# Patient Record
Sex: FEMALE | Race: WHITE | NOT HISPANIC OR LATINO
[De-identification: names, ages, dates, MRNs, and addresses within clinical notes are randomized per-mention and may not be internally consistent; named-entity substitution may affect disease eponyms.]

---

## 2017-01-04 ENCOUNTER — APPOINTMENT (OUTPATIENT)
Dept: INTERNAL MEDICINE | Facility: CLINIC | Age: 82
End: 2017-01-04

## 2017-01-04 ENCOUNTER — LABORATORY RESULT (OUTPATIENT)
Age: 82
End: 2017-01-04

## 2017-03-21 ENCOUNTER — RX RENEWAL (OUTPATIENT)
Age: 82
End: 2017-03-21

## 2017-03-23 ENCOUNTER — RX RENEWAL (OUTPATIENT)
Age: 82
End: 2017-03-23

## 2017-04-05 ENCOUNTER — MEDICATION RENEWAL (OUTPATIENT)
Age: 82
End: 2017-04-05

## 2017-06-26 ENCOUNTER — RX RENEWAL (OUTPATIENT)
Age: 82
End: 2017-06-26

## 2017-06-27 ENCOUNTER — MEDICATION RENEWAL (OUTPATIENT)
Age: 82
End: 2017-06-27

## 2017-07-21 ENCOUNTER — APPOINTMENT (OUTPATIENT)
Dept: INTERNAL MEDICINE | Facility: CLINIC | Age: 82
End: 2017-07-21

## 2017-07-21 ENCOUNTER — LABORATORY RESULT (OUTPATIENT)
Age: 82
End: 2017-07-21

## 2017-07-21 VITALS
SYSTOLIC BLOOD PRESSURE: 120 MMHG | TEMPERATURE: 97.3 F | WEIGHT: 138 LBS | DIASTOLIC BLOOD PRESSURE: 70 MMHG | BODY MASS INDEX: 25.24 KG/M2

## 2017-07-21 DIAGNOSIS — R06.00 DYSPNEA, UNSPECIFIED: ICD-10-CM

## 2017-07-21 DIAGNOSIS — I49.9 CARDIAC ARRHYTHMIA, UNSPECIFIED: ICD-10-CM

## 2017-07-22 ENCOUNTER — LABORATORY RESULT (OUTPATIENT)
Age: 82
End: 2017-07-22

## 2017-07-25 ENCOUNTER — MESSAGE (OUTPATIENT)
Age: 82
End: 2017-07-25

## 2017-07-27 ENCOUNTER — TRANSCRIPTION ENCOUNTER (OUTPATIENT)
Age: 82
End: 2017-07-27

## 2017-07-27 ENCOUNTER — MESSAGE (OUTPATIENT)
Age: 82
End: 2017-07-27

## 2017-07-29 ENCOUNTER — MESSAGE (OUTPATIENT)
Age: 82
End: 2017-07-29

## 2017-08-06 LAB
ALBUMIN SERPL ELPH-MCNC: 4.2 G/DL
ALP BLD-CCNC: 69 U/L
ALT SERPL-CCNC: 15 U/L
ANION GAP SERPL CALC-SCNC: 16 MMOL/L
APPEARANCE: ABNORMAL
AST SERPL-CCNC: 14 U/L
BACTERIA: ABNORMAL
BASOPHILS # BLD AUTO: 0.04 K/UL
BASOPHILS NFR BLD AUTO: 0.4 %
BILIRUB SERPL-MCNC: 0.8 MG/DL
BILIRUBIN URINE: NEGATIVE
BLOOD URINE: NEGATIVE
BUN SERPL-MCNC: 24 MG/DL
CALCIUM SERPL-MCNC: 9.2 MG/DL
CHLORIDE SERPL-SCNC: 106 MMOL/L
CHOLEST SERPL-MCNC: 145 MG/DL
CHOLEST/HDLC SERPL: 3.3 RATIO
CO2 SERPL-SCNC: 23 MMOL/L
COLOR: YELLOW
CREAT SERPL-MCNC: 0.77 MG/DL
EOSINOPHIL # BLD AUTO: 0.28 K/UL
EOSINOPHIL NFR BLD AUTO: 3.1 %
GLUCOSE QUALITATIVE U: NORMAL MG/DL
GLUCOSE SERPL-MCNC: 125 MG/DL
HCT VFR BLD CALC: 37 %
HDLC SERPL-MCNC: 44 MG/DL
HGB BLD-MCNC: 11.9 G/DL
HYALINE CASTS: 0 /LPF
IMM GRANULOCYTES NFR BLD AUTO: 0.3 %
KETONES URINE: NEGATIVE
LDLC SERPL CALC-MCNC: 56 MG/DL
LDLC SERPL DIRECT ASSAY-MCNC: 76 MG/DL
LEUKOCYTE ESTERASE URINE: ABNORMAL
LYMPHOCYTES # BLD AUTO: 2.85 K/UL
LYMPHOCYTES NFR BLD AUTO: 31.4 %
MAN DIFF?: NORMAL
MCHC RBC-ENTMCNC: 31.8 PG
MCHC RBC-ENTMCNC: 32.2 GM/DL
MCV RBC AUTO: 98.9 FL
MICROSCOPIC-UA: NORMAL
MONOCYTES # BLD AUTO: 0.87 K/UL
MONOCYTES NFR BLD AUTO: 9.6 %
NEUTROPHILS # BLD AUTO: 5 K/UL
NEUTROPHILS NFR BLD AUTO: 55.2 %
NITRITE URINE: POSITIVE
NT-PROBNP SERPL-MCNC: 171 PG/ML
PH URINE: 5.5
PLATELET # BLD AUTO: 239 K/UL
POTASSIUM SERPL-SCNC: 4.3 MMOL/L
PROT SERPL-MCNC: 7.5 G/DL
PROTEIN URINE: NEGATIVE MG/DL
RBC # BLD: 3.74 M/UL
RBC # FLD: 13.4 %
RED BLOOD CELLS URINE: 3 /HPF
SODIUM SERPL-SCNC: 145 MMOL/L
SPECIFIC GRAVITY URINE: 1.02
SQUAMOUS EPITHELIAL CELLS: 4 /HPF
T3 SERPL-MCNC: 77 NG/DL
T3RU NFR SERPL: 0.96 INDEX
T4 FREE SERPL-MCNC: 1.6 NG/DL
T4 SERPL-MCNC: 9 UG/DL
TRIGL SERPL-MCNC: 224 MG/DL
TSH SERPL-ACNC: 1.29 UIU/ML
URINE COMMENTS: NORMAL
UROBILINOGEN URINE: NORMAL MG/DL
WBC # FLD AUTO: 9.07 K/UL
WHITE BLOOD CELLS URINE: 77 /HPF

## 2017-09-09 ENCOUNTER — RX RENEWAL (OUTPATIENT)
Age: 82
End: 2017-09-09

## 2017-10-18 ENCOUNTER — TRANSCRIPTION ENCOUNTER (OUTPATIENT)
Age: 82
End: 2017-10-18

## 2017-11-16 ENCOUNTER — MEDICATION RENEWAL (OUTPATIENT)
Age: 82
End: 2017-11-16

## 2017-11-19 ENCOUNTER — MEDICATION RENEWAL (OUTPATIENT)
Age: 82
End: 2017-11-19

## 2017-11-23 ENCOUNTER — RX RENEWAL (OUTPATIENT)
Age: 82
End: 2017-11-23

## 2017-12-19 ENCOUNTER — MEDICATION RENEWAL (OUTPATIENT)
Age: 82
End: 2017-12-19

## 2017-12-26 ENCOUNTER — MEDICATION RENEWAL (OUTPATIENT)
Age: 82
End: 2017-12-26

## 2018-03-07 ENCOUNTER — RX RENEWAL (OUTPATIENT)
Age: 83
End: 2018-03-07

## 2018-03-08 ENCOUNTER — RX RENEWAL (OUTPATIENT)
Age: 83
End: 2018-03-08

## 2018-03-08 ENCOUNTER — RECORD ABSTRACTING (OUTPATIENT)
Age: 83
End: 2018-03-08

## 2018-04-03 ENCOUNTER — APPOINTMENT (OUTPATIENT)
Dept: CARDIOLOGY | Facility: CLINIC | Age: 83
End: 2018-04-03
Payer: MEDICARE

## 2018-04-03 PROCEDURE — 93306 TTE W/DOPPLER COMPLETE: CPT

## 2018-04-12 ENCOUNTER — RESULT REVIEW (OUTPATIENT)
Age: 83
End: 2018-04-12

## 2018-05-12 ENCOUNTER — LABORATORY RESULT (OUTPATIENT)
Age: 83
End: 2018-05-12

## 2018-05-12 ENCOUNTER — APPOINTMENT (OUTPATIENT)
Dept: INTERNAL MEDICINE | Facility: CLINIC | Age: 83
End: 2018-05-12
Payer: MEDICARE

## 2018-05-12 VITALS
WEIGHT: 135 LBS | TEMPERATURE: 97.6 F | SYSTOLIC BLOOD PRESSURE: 130 MMHG | DIASTOLIC BLOOD PRESSURE: 74 MMHG | HEART RATE: 66 BPM | BODY MASS INDEX: 24.84 KG/M2 | HEIGHT: 62 IN | OXYGEN SATURATION: 92 % | RESPIRATION RATE: 14 BRPM

## 2018-05-12 DIAGNOSIS — M15.9 POLYOSTEOARTHRITIS, UNSPECIFIED: ICD-10-CM

## 2018-05-12 DIAGNOSIS — R15.9 FULL INCONTINENCE OF FECES: ICD-10-CM

## 2018-05-12 DIAGNOSIS — M81.0 AGE-RELATED OSTEOPOROSIS W/OUT CURRENT PATHOLOGICAL FRACTURE: ICD-10-CM

## 2018-05-12 DIAGNOSIS — J45.40 MODERATE PERSISTENT ASTHMA, UNCOMPLICATED: ICD-10-CM

## 2018-05-12 DIAGNOSIS — J44.9 CHRONIC OBSTRUCTIVE PULMONARY DISEASE, UNSPECIFIED: ICD-10-CM

## 2018-05-12 DIAGNOSIS — R15.2 FULL INCONTINENCE OF FECES: ICD-10-CM

## 2018-05-12 PROCEDURE — 93000 ELECTROCARDIOGRAM COMPLETE: CPT

## 2018-05-12 PROCEDURE — 94010 BREATHING CAPACITY TEST: CPT

## 2018-05-12 PROCEDURE — 99215 OFFICE O/P EST HI 40 MIN: CPT | Mod: 25

## 2018-05-12 PROCEDURE — 36415 COLL VENOUS BLD VENIPUNCTURE: CPT

## 2018-05-14 ENCOUNTER — LABORATORY RESULT (OUTPATIENT)
Age: 83
End: 2018-05-14

## 2018-05-14 ENCOUNTER — APPOINTMENT (OUTPATIENT)
Dept: INTERNAL MEDICINE | Facility: CLINIC | Age: 83
End: 2018-05-14
Payer: MEDICARE

## 2018-05-14 VITALS
SYSTOLIC BLOOD PRESSURE: 142 MMHG | HEIGHT: 62 IN | TEMPERATURE: 98.5 F | WEIGHT: 135 LBS | DIASTOLIC BLOOD PRESSURE: 70 MMHG | BODY MASS INDEX: 24.84 KG/M2

## 2018-05-14 DIAGNOSIS — Z87.440 PERSONAL HISTORY OF URINARY (TRACT) INFECTIONS: ICD-10-CM

## 2018-05-14 PROCEDURE — 99214 OFFICE O/P EST MOD 30 MIN: CPT

## 2018-05-15 LAB
25(OH)D3 SERPL-MCNC: 16.2 NG/ML
ALBUMIN SERPL ELPH-MCNC: 3.9 G/DL
ALP BLD-CCNC: 74 U/L
ALT SERPL-CCNC: 11 U/L
ANION GAP SERPL CALC-SCNC: 14 MMOL/L
APPEARANCE: ABNORMAL
AST SERPL-CCNC: 17 U/L
BASOPHILS # BLD AUTO: 0.04 K/UL
BASOPHILS NFR BLD AUTO: 0.5 %
BILIRUB SERPL-MCNC: 0.7 MG/DL
BILIRUBIN URINE: NEGATIVE
BLOOD URINE: ABNORMAL
BUN SERPL-MCNC: 21 MG/DL
CALCIUM SERPL-MCNC: 9.1 MG/DL
CHLORIDE SERPL-SCNC: 103 MMOL/L
CHOLEST SERPL-MCNC: 128 MG/DL
CHOLEST/HDLC SERPL: 2.7 RATIO
CO2 SERPL-SCNC: 27 MMOL/L
COLOR: YELLOW
CREAT SERPL-MCNC: 0.69 MG/DL
EOSINOPHIL # BLD AUTO: 0.31 K/UL
EOSINOPHIL NFR BLD AUTO: 3.8 %
GLUCOSE QUALITATIVE U: NEGATIVE MG/DL
GLUCOSE SERPL-MCNC: 102 MG/DL
HCT VFR BLD CALC: 38.9 %
HDLC SERPL-MCNC: 48 MG/DL
HGB BLD-MCNC: 11.6 G/DL
IMM GRANULOCYTES NFR BLD AUTO: 0.2 %
KETONES URINE: NEGATIVE
LDLC SERPL CALC-MCNC: 55 MG/DL
LEUKOCYTE ESTERASE URINE: ABNORMAL
LYMPHOCYTES # BLD AUTO: 2.66 K/UL
LYMPHOCYTES NFR BLD AUTO: 32.2 %
MAN DIFF?: NORMAL
MCHC RBC-ENTMCNC: 29.8 GM/DL
MCHC RBC-ENTMCNC: 31.1 PG
MCV RBC AUTO: 104.3 FL
MONOCYTES # BLD AUTO: 1.02 K/UL
MONOCYTES NFR BLD AUTO: 12.3 %
NEUTROPHILS # BLD AUTO: 4.21 K/UL
NEUTROPHILS NFR BLD AUTO: 51 %
NITRITE URINE: POSITIVE
PH URINE: 6
PLATELET # BLD AUTO: 296 K/UL
POTASSIUM SERPL-SCNC: 4.4 MMOL/L
PROT SERPL-MCNC: 7.6 G/DL
PROTEIN URINE: 30 MG/DL
RBC # BLD: 3.73 M/UL
RBC # FLD: 14.1 %
SAVE SPECIMEN: NORMAL
SODIUM SERPL-SCNC: 144 MMOL/L
SPECIFIC GRAVITY URINE: 1.02
T3 SERPL-MCNC: 92 NG/DL
T4 SERPL-MCNC: 10.4 UG/DL
TRIGL SERPL-MCNC: 126 MG/DL
TSH SERPL-ACNC: 0.73 UIU/ML
UROBILINOGEN URINE: NEGATIVE MG/DL
WBC # FLD AUTO: 8.26 K/UL

## 2018-05-17 ENCOUNTER — OTHER (OUTPATIENT)
Age: 83
End: 2018-05-17

## 2018-05-17 LAB
APPEARANCE: CLEAR
BILIRUBIN URINE: NEGATIVE
BLOOD URINE: NEGATIVE
COLOR: YELLOW
GLUCOSE QUALITATIVE U: NEGATIVE
KETONES URINE: NEGATIVE
LEUKOCYTE ESTERASE URINE: ABNORMAL
NITRITE URINE: POSITIVE
PH URINE: 6.5
PROTEIN URINE: ABNORMAL
SPECIFIC GRAVITY URINE: 1.01
UROBILINOGEN URINE: NEGATIVE

## 2018-05-30 ENCOUNTER — LABORATORY RESULT (OUTPATIENT)
Age: 83
End: 2018-05-30

## 2018-05-30 ENCOUNTER — APPOINTMENT (OUTPATIENT)
Dept: INTERNAL MEDICINE | Facility: CLINIC | Age: 83
End: 2018-05-30
Payer: MEDICARE

## 2018-05-30 PROCEDURE — 45385 COLONOSCOPY W/LESION REMOVAL: CPT

## 2018-05-30 PROCEDURE — 45380 COLONOSCOPY AND BIOPSY: CPT | Mod: 59

## 2018-07-16 ENCOUNTER — FORM ENCOUNTER (OUTPATIENT)
Age: 83
End: 2018-07-16

## 2018-07-16 ENCOUNTER — APPOINTMENT (OUTPATIENT)
Dept: INTERNAL MEDICINE | Facility: CLINIC | Age: 83
End: 2018-07-16
Payer: MEDICARE

## 2018-07-16 VITALS
DIASTOLIC BLOOD PRESSURE: 64 MMHG | OXYGEN SATURATION: 93 % | RESPIRATION RATE: 16 BRPM | TEMPERATURE: 98.6 F | SYSTOLIC BLOOD PRESSURE: 138 MMHG | HEIGHT: 62 IN | WEIGHT: 135 LBS | HEART RATE: 67 BPM | BODY MASS INDEX: 24.84 KG/M2

## 2018-07-16 DIAGNOSIS — G82.20 PARAPLEGIA, UNSPECIFIED: ICD-10-CM

## 2018-07-16 DIAGNOSIS — R07.1 CHEST PAIN ON BREATHING: ICD-10-CM

## 2018-07-16 DIAGNOSIS — J15.9 UNSPECIFIED BACTERIAL PNEUMONIA: ICD-10-CM

## 2018-07-16 PROCEDURE — 99215 OFFICE O/P EST HI 40 MIN: CPT

## 2018-07-16 NOTE — REVIEW OF SYSTEMS
[Chest Pain] : chest pain [Unsteady Walking] : ataxia [Negative] : Psychiatric [Palpitations] : no palpitations [Leg Claudication] : no leg claudication [Lower Ext Edema] : no lower extremity edema [Orthopnea] : no orthopnea [Paroysmal Nocturnal Dyspnea] : no paroysmal nocturnal dyspnea [Headache] : no headache [Dizziness] : no dizziness [Fainting] : no fainting [Confusion] : no confusion [Memory Loss] : no memory loss [FreeTextEntry5] : See present illness [FreeTextEntry6] : See present illness [FreeTextEntry9] : See present illness [de-identified] : See present illness

## 2018-07-16 NOTE — HEALTH RISK ASSESSMENT
[No falls in past year] : Patient reported no falls in the past year [0] : 2) Feeling down, depressed, or hopeless: Not at all (0) [] : No [de-identified] : none [[id number]] : 0

## 2018-07-16 NOTE — HISTORY OF PRESENT ILLNESS
[FreeTextEntry8] : Complains right chest pain with deep breaths, and concomitant weakness in both lower extremities for 3 days.\par \par The chest pain which is dull is unrelated to exertion or meals. It is not present with quiet respiration. No cough or wheezing. No shortness of breath.\par \par She has chronic low back pain with scoliosis and spinal stenosis. The weakness of both lower extremities  is sudden onset. No urinary difficulty.

## 2018-07-16 NOTE — DATA REVIEWED
[FreeTextEntry1] : Chest x-ray and pulmonary function tests are refused by the patient.Pulse oximetry is 93%.

## 2018-07-17 ENCOUNTER — APPOINTMENT (OUTPATIENT)
Dept: RADIOLOGY | Facility: IMAGING CENTER | Age: 83
End: 2018-07-17
Payer: MEDICARE

## 2018-07-17 ENCOUNTER — OUTPATIENT (OUTPATIENT)
Dept: OUTPATIENT SERVICES | Facility: HOSPITAL | Age: 83
LOS: 1 days | End: 2018-07-17
Payer: MEDICARE

## 2018-07-17 DIAGNOSIS — R07.1 CHEST PAIN ON BREATHING: ICD-10-CM

## 2018-07-17 DIAGNOSIS — G82.20 PARAPLEGIA, UNSPECIFIED: ICD-10-CM

## 2018-07-17 PROBLEM — J15.9 BACTERIAL PNEUMONIA: Status: ACTIVE | Noted: 2018-07-17

## 2018-07-17 PROCEDURE — 72100 X-RAY EXAM L-S SPINE 2/3 VWS: CPT | Mod: 26

## 2018-07-17 PROCEDURE — 71046 X-RAY EXAM CHEST 2 VIEWS: CPT | Mod: 26

## 2018-07-17 PROCEDURE — 72070 X-RAY EXAM THORAC SPINE 2VWS: CPT | Mod: 26

## 2018-07-17 PROCEDURE — 71046 X-RAY EXAM CHEST 2 VIEWS: CPT

## 2018-07-17 PROCEDURE — 72100 X-RAY EXAM L-S SPINE 2/3 VWS: CPT

## 2018-07-17 PROCEDURE — 72070 X-RAY EXAM THORAC SPINE 2VWS: CPT

## 2018-07-18 ENCOUNTER — OUTPATIENT (OUTPATIENT)
Dept: OUTPATIENT SERVICES | Facility: HOSPITAL | Age: 83
LOS: 1 days | End: 2018-07-18
Payer: MEDICARE

## 2018-07-18 ENCOUNTER — APPOINTMENT (OUTPATIENT)
Dept: MRI IMAGING | Facility: CLINIC | Age: 83
End: 2018-07-18
Payer: MEDICARE

## 2018-07-18 DIAGNOSIS — M54.14 RADICULOPATHY, THORACIC REGION: ICD-10-CM

## 2018-07-18 PROCEDURE — 72146 MRI CHEST SPINE W/O DYE: CPT | Mod: 26

## 2018-07-18 PROCEDURE — 72146 MRI CHEST SPINE W/O DYE: CPT

## 2018-07-24 PROBLEM — I49.9 SINUS ARRHYTHMIA: Status: ACTIVE | Noted: 2017-07-21

## 2018-08-02 ENCOUNTER — FORM ENCOUNTER (OUTPATIENT)
Age: 83
End: 2018-08-02

## 2018-08-03 ENCOUNTER — APPOINTMENT (OUTPATIENT)
Dept: RADIOLOGY | Facility: CLINIC | Age: 83
End: 2018-08-03
Payer: MEDICARE

## 2018-08-03 ENCOUNTER — OUTPATIENT (OUTPATIENT)
Dept: OUTPATIENT SERVICES | Facility: HOSPITAL | Age: 83
LOS: 1 days | End: 2018-08-03
Payer: MEDICARE

## 2018-08-03 ENCOUNTER — LABORATORY RESULT (OUTPATIENT)
Age: 83
End: 2018-08-03

## 2018-08-03 ENCOUNTER — NON-APPOINTMENT (OUTPATIENT)
Age: 83
End: 2018-08-03

## 2018-08-03 ENCOUNTER — APPOINTMENT (OUTPATIENT)
Dept: CARDIOLOGY | Facility: CLINIC | Age: 83
End: 2018-08-03
Payer: MEDICARE

## 2018-08-03 VITALS
OXYGEN SATURATION: 95 % | HEART RATE: 60 BPM | WEIGHT: 135 LBS | SYSTOLIC BLOOD PRESSURE: 120 MMHG | BODY MASS INDEX: 24.69 KG/M2 | DIASTOLIC BLOOD PRESSURE: 80 MMHG

## 2018-08-03 DIAGNOSIS — Z00.8 ENCOUNTER FOR OTHER GENERAL EXAMINATION: ICD-10-CM

## 2018-08-03 PROCEDURE — 99214 OFFICE O/P EST MOD 30 MIN: CPT

## 2018-08-03 PROCEDURE — 36415 COLL VENOUS BLD VENIPUNCTURE: CPT

## 2018-08-03 PROCEDURE — 71046 X-RAY EXAM CHEST 2 VIEWS: CPT

## 2018-08-03 PROCEDURE — 93000 ELECTROCARDIOGRAM COMPLETE: CPT

## 2018-08-03 PROCEDURE — 71046 X-RAY EXAM CHEST 2 VIEWS: CPT | Mod: 26

## 2018-08-03 PROCEDURE — 93040 RHYTHM ECG WITH REPORT: CPT | Mod: 59

## 2018-08-04 ENCOUNTER — MESSAGE (OUTPATIENT)
Age: 83
End: 2018-08-04

## 2018-08-06 ENCOUNTER — APPOINTMENT (OUTPATIENT)
Dept: CARDIOLOGY | Facility: CLINIC | Age: 83
End: 2018-08-06

## 2018-08-09 ENCOUNTER — MESSAGE (OUTPATIENT)
Age: 83
End: 2018-08-09

## 2018-08-27 ENCOUNTER — RX RENEWAL (OUTPATIENT)
Age: 83
End: 2018-08-27

## 2018-08-29 ENCOUNTER — RX RENEWAL (OUTPATIENT)
Age: 83
End: 2018-08-29

## 2018-09-01 LAB
ALBUMIN SERPL ELPH-MCNC: 4.1 G/DL
ALP BLD-CCNC: 62 U/L
ALT SERPL-CCNC: 7 U/L
ANION GAP SERPL CALC-SCNC: 17 MMOL/L
AST SERPL-CCNC: 17 U/L
BASOPHILS # BLD AUTO: 0.04 K/UL
BASOPHILS NFR BLD AUTO: 0.5 %
BILIRUB SERPL-MCNC: 0.3 MG/DL
BUN SERPL-MCNC: 22 MG/DL
CALCIUM SERPL-MCNC: 9.7 MG/DL
CHLORIDE SERPL-SCNC: 99 MMOL/L
CHOLEST SERPL-MCNC: 117 MG/DL
CHOLEST/HDLC SERPL: 3.7 RATIO
CO2 SERPL-SCNC: 27 MMOL/L
CREAT SERPL-MCNC: 0.64 MG/DL
EOSINOPHIL # BLD AUTO: 0.09 K/UL
EOSINOPHIL NFR BLD AUTO: 1.1 %
GLUCOSE SERPL-MCNC: 104 MG/DL
GLUCOSE SERPL-MCNC: 45 MG/DL
HCT VFR BLD CALC: 36 %
HDLC SERPL-MCNC: 32 MG/DL
HGB BLD-MCNC: 10.7 G/DL
IMM GRANULOCYTES NFR BLD AUTO: 0.2 %
LDLC SERPL CALC-MCNC: 37 MG/DL
LDLC SERPL DIRECT ASSAY-MCNC: 50 MG/DL
LYMPHOCYTES # BLD AUTO: 2.62 K/UL
LYMPHOCYTES NFR BLD AUTO: 31.6 %
MAN DIFF?: NORMAL
MCHC RBC-ENTMCNC: 29.7 GM/DL
MCHC RBC-ENTMCNC: 30.5 PG
MCV RBC AUTO: 102.6 FL
MONOCYTES # BLD AUTO: 0.87 K/UL
MONOCYTES NFR BLD AUTO: 10.5 %
NEUTROPHILS # BLD AUTO: 4.64 K/UL
NEUTROPHILS NFR BLD AUTO: 56.1 %
PLATELET # BLD AUTO: 379 K/UL
POTASSIUM SERPL-SCNC: 4.4 MMOL/L
PROT SERPL-MCNC: 7.7 G/DL
RBC # BLD: 3.51 M/UL
RBC # FLD: 13.3 %
SODIUM SERPL-SCNC: 143 MMOL/L
T3 SERPL-MCNC: 102 NG/DL
T3RU NFR SERPL: 0.95 INDEX
T4 FREE SERPL-MCNC: 1.6 NG/DL
T4 SERPL-MCNC: 8.8 UG/DL
TRIGL SERPL-MCNC: 241 MG/DL
TSH SERPL-ACNC: 0.21 UIU/ML
WBC # FLD AUTO: 8.28 K/UL

## 2018-09-02 ENCOUNTER — NON-APPOINTMENT (OUTPATIENT)
Age: 83
End: 2018-09-02

## 2018-10-15 ENCOUNTER — APPOINTMENT (OUTPATIENT)
Dept: ORTHOPEDIC SURGERY | Facility: CLINIC | Age: 83
End: 2018-10-15
Payer: MEDICARE

## 2018-10-15 VITALS
HEIGHT: 62 IN | WEIGHT: 135 LBS | SYSTOLIC BLOOD PRESSURE: 129 MMHG | HEART RATE: 66 BPM | DIASTOLIC BLOOD PRESSURE: 69 MMHG | BODY MASS INDEX: 24.84 KG/M2

## 2018-10-15 DIAGNOSIS — J18.9 PNEUMONIA, UNSPECIFIED ORGANISM: ICD-10-CM

## 2018-10-15 DIAGNOSIS — M47.26 OTHER SPONDYLOSIS WITH RADICULOPATHY, LUMBAR REGION: ICD-10-CM

## 2018-10-15 DIAGNOSIS — M48.061 SPINAL STENOSIS, LUMBAR REGION WITHOUT NEUROGENIC CLAUDICATION: ICD-10-CM

## 2018-10-15 DIAGNOSIS — Z86.39 PERSONAL HISTORY OF OTHER ENDOCRINE, NUTRITIONAL AND METABOLIC DISEASE: ICD-10-CM

## 2018-10-15 DIAGNOSIS — M41.25 OTHER IDIOPATHIC SCOLIOSIS, THORACOLUMBAR REGION: ICD-10-CM

## 2018-10-15 DIAGNOSIS — Z87.09 PERSONAL HISTORY OF OTHER DISEASES OF THE RESPIRATORY SYSTEM: ICD-10-CM

## 2018-10-15 DIAGNOSIS — Z87.898 PERSONAL HISTORY OF OTHER SPECIFIED CONDITIONS: ICD-10-CM

## 2018-10-15 PROCEDURE — 99204 OFFICE O/P NEW MOD 45 MIN: CPT

## 2018-10-26 ENCOUNTER — MED ADMIN CHARGE (OUTPATIENT)
Age: 83
End: 2018-10-26

## 2018-10-26 ENCOUNTER — MEDICATION RENEWAL (OUTPATIENT)
Age: 83
End: 2018-10-26

## 2018-11-06 ENCOUNTER — APPOINTMENT (OUTPATIENT)
Dept: CARDIOLOGY | Facility: CLINIC | Age: 83
End: 2018-11-06
Payer: MEDICARE

## 2018-11-06 ENCOUNTER — NON-APPOINTMENT (OUTPATIENT)
Age: 83
End: 2018-11-06

## 2018-11-06 VITALS — HEART RATE: 52 BPM | OXYGEN SATURATION: 95 % | DIASTOLIC BLOOD PRESSURE: 60 MMHG | SYSTOLIC BLOOD PRESSURE: 130 MMHG

## 2018-11-06 VITALS — WEIGHT: 131 LBS | BODY MASS INDEX: 24.35 KG/M2

## 2018-11-06 PROCEDURE — 93306 TTE W/DOPPLER COMPLETE: CPT

## 2018-11-06 PROCEDURE — 93040 RHYTHM ECG WITH REPORT: CPT | Mod: 59

## 2018-11-06 PROCEDURE — 93000 ELECTROCARDIOGRAM COMPLETE: CPT

## 2018-11-06 PROCEDURE — 99214 OFFICE O/P EST MOD 30 MIN: CPT | Mod: 25

## 2018-11-06 RX ORDER — SULFAMETHOXAZOLE AND TRIMETHOPRIM 800; 160 MG/1; MG/1
800-160 TABLET ORAL TWICE DAILY
Qty: 14 | Refills: 0 | Status: DISCONTINUED | COMMUNITY
Start: 2018-05-15 | End: 2018-11-06

## 2018-11-06 RX ORDER — AMOXICILLIN AND CLAVULANATE POTASSIUM 500; 125 MG/1; MG/1
500-125 TABLET, FILM COATED ORAL
Qty: 20 | Refills: 0 | Status: DISCONTINUED | COMMUNITY
Start: 2018-07-17 | End: 2018-11-06

## 2018-11-12 ENCOUNTER — APPOINTMENT (OUTPATIENT)
Dept: ORTHOPEDIC SURGERY | Facility: CLINIC | Age: 83
End: 2018-11-12

## 2018-11-13 ENCOUNTER — MEDICATION RENEWAL (OUTPATIENT)
Age: 83
End: 2018-11-13

## 2018-11-16 ENCOUNTER — RESULT CHARGE (OUTPATIENT)
Age: 83
End: 2018-11-16

## 2018-11-26 ENCOUNTER — APPOINTMENT (OUTPATIENT)
Dept: ORTHOPEDIC SURGERY | Facility: CLINIC | Age: 83
End: 2018-11-26
Payer: MEDICARE

## 2018-11-26 VITALS
BODY MASS INDEX: 26.06 KG/M2 | WEIGHT: 138 LBS | HEART RATE: 59 BPM | SYSTOLIC BLOOD PRESSURE: 162 MMHG | HEIGHT: 61 IN | DIASTOLIC BLOOD PRESSURE: 83 MMHG

## 2018-11-26 DIAGNOSIS — M54.16 RADICULOPATHY, LUMBAR REGION: ICD-10-CM

## 2018-11-26 DIAGNOSIS — M48.061 SPINAL STENOSIS, LUMBAR REGION WITHOUT NEUROGENIC CLAUDICATION: ICD-10-CM

## 2018-11-26 DIAGNOSIS — M54.5 LOW BACK PAIN: ICD-10-CM

## 2018-11-26 PROCEDURE — 99214 OFFICE O/P EST MOD 30 MIN: CPT

## 2018-11-30 ENCOUNTER — MEDICATION RENEWAL (OUTPATIENT)
Age: 83
End: 2018-11-30

## 2018-12-05 ENCOUNTER — MEDICATION RENEWAL (OUTPATIENT)
Age: 83
End: 2018-12-05

## 2018-12-08 ENCOUNTER — MEDICATION RENEWAL (OUTPATIENT)
Age: 83
End: 2018-12-08

## 2018-12-10 ENCOUNTER — NON-APPOINTMENT (OUTPATIENT)
Age: 83
End: 2018-12-10

## 2018-12-10 ENCOUNTER — APPOINTMENT (OUTPATIENT)
Dept: CARDIOLOGY | Facility: CLINIC | Age: 83
End: 2018-12-10
Payer: MEDICARE

## 2018-12-10 VITALS
HEIGHT: 61 IN | OXYGEN SATURATION: 95 % | HEART RATE: 58 BPM | BODY MASS INDEX: 26.06 KG/M2 | SYSTOLIC BLOOD PRESSURE: 145 MMHG | WEIGHT: 138 LBS | DIASTOLIC BLOOD PRESSURE: 63 MMHG

## 2018-12-10 DIAGNOSIS — R60.9 EDEMA, UNSPECIFIED: ICD-10-CM

## 2018-12-10 PROCEDURE — 93040 RHYTHM ECG WITH REPORT: CPT | Mod: 59

## 2018-12-10 PROCEDURE — 93000 ELECTROCARDIOGRAM COMPLETE: CPT

## 2018-12-10 PROCEDURE — 99214 OFFICE O/P EST MOD 30 MIN: CPT

## 2018-12-15 ENCOUNTER — RESULT CHARGE (OUTPATIENT)
Age: 83
End: 2018-12-15

## 2018-12-16 PROBLEM — R60.9 EDEMA: Status: ACTIVE | Noted: 2018-12-16

## 2018-12-17 ENCOUNTER — MEDICATION RENEWAL (OUTPATIENT)
Age: 83
End: 2018-12-17

## 2018-12-26 ENCOUNTER — NON-APPOINTMENT (OUTPATIENT)
Age: 83
End: 2018-12-26

## 2019-01-02 ENCOUNTER — APPOINTMENT (OUTPATIENT)
Dept: ORTHOPEDIC SURGERY | Facility: CLINIC | Age: 84
End: 2019-01-02

## 2019-01-12 ENCOUNTER — TRANSCRIPTION ENCOUNTER (OUTPATIENT)
Age: 84
End: 2019-01-12

## 2019-01-12 ENCOUNTER — EMERGENCY (EMERGENCY)
Facility: HOSPITAL | Age: 84
LOS: 1 days | Discharge: LEFT BEFORE DISCHARGE | End: 2019-01-12
Attending: PERSONAL EMERGENCY RESPONSE ATTENDANT
Payer: MEDICARE

## 2019-01-12 VITALS
TEMPERATURE: 98 F | RESPIRATION RATE: 18 BRPM | HEART RATE: 60 BPM | DIASTOLIC BLOOD PRESSURE: 74 MMHG | OXYGEN SATURATION: 98 % | SYSTOLIC BLOOD PRESSURE: 133 MMHG

## 2019-01-12 VITALS
OXYGEN SATURATION: 96 % | DIASTOLIC BLOOD PRESSURE: 62 MMHG | RESPIRATION RATE: 16 BRPM | HEART RATE: 62 BPM | SYSTOLIC BLOOD PRESSURE: 138 MMHG

## 2019-01-12 LAB
ALBUMIN SERPL ELPH-MCNC: 4.7 G/DL — SIGNIFICANT CHANGE UP (ref 3.3–5)
ALP SERPL-CCNC: 74 U/L — SIGNIFICANT CHANGE UP (ref 40–120)
ALT FLD-CCNC: 15 U/L — SIGNIFICANT CHANGE UP (ref 10–45)
ANION GAP SERPL CALC-SCNC: 13 MMOL/L — SIGNIFICANT CHANGE UP (ref 5–17)
AST SERPL-CCNC: 13 U/L — SIGNIFICANT CHANGE UP (ref 10–40)
BASOPHILS # BLD AUTO: 0 K/UL — SIGNIFICANT CHANGE UP (ref 0–0.2)
BASOPHILS NFR BLD AUTO: 0.3 % — SIGNIFICANT CHANGE UP (ref 0–2)
BILIRUB SERPL-MCNC: 0.7 MG/DL — SIGNIFICANT CHANGE UP (ref 0.2–1.2)
BUN SERPL-MCNC: 15 MG/DL — SIGNIFICANT CHANGE UP (ref 7–23)
CALCIUM SERPL-MCNC: 9.5 MG/DL — SIGNIFICANT CHANGE UP (ref 8.4–10.5)
CHLORIDE SERPL-SCNC: 104 MMOL/L — SIGNIFICANT CHANGE UP (ref 96–108)
CO2 SERPL-SCNC: 27 MMOL/L — SIGNIFICANT CHANGE UP (ref 22–31)
CREAT SERPL-MCNC: 0.57 MG/DL — SIGNIFICANT CHANGE UP (ref 0.5–1.3)
EOSINOPHIL # BLD AUTO: 0.1 K/UL — SIGNIFICANT CHANGE UP (ref 0–0.5)
EOSINOPHIL NFR BLD AUTO: 0.5 % — SIGNIFICANT CHANGE UP (ref 0–6)
GLUCOSE SERPL-MCNC: 135 MG/DL — HIGH (ref 70–99)
HCT VFR BLD CALC: 40.3 % — SIGNIFICANT CHANGE UP (ref 34.5–45)
HGB BLD-MCNC: 13.5 G/DL — SIGNIFICANT CHANGE UP (ref 11.5–15.5)
LIDOCAIN IGE QN: 25 U/L — SIGNIFICANT CHANGE UP (ref 7–60)
LYMPHOCYTES # BLD AUTO: 17.8 % — SIGNIFICANT CHANGE UP (ref 13–44)
LYMPHOCYTES # BLD AUTO: 2 K/UL — SIGNIFICANT CHANGE UP (ref 1–3.3)
MCHC RBC-ENTMCNC: 32.9 PG — SIGNIFICANT CHANGE UP (ref 27–34)
MCHC RBC-ENTMCNC: 33.4 GM/DL — SIGNIFICANT CHANGE UP (ref 32–36)
MCV RBC AUTO: 98.7 FL — SIGNIFICANT CHANGE UP (ref 80–100)
MONOCYTES # BLD AUTO: 0.9 K/UL — SIGNIFICANT CHANGE UP (ref 0–0.9)
MONOCYTES NFR BLD AUTO: 8.4 % — SIGNIFICANT CHANGE UP (ref 2–14)
NEUTROPHILS # BLD AUTO: 8.2 K/UL — HIGH (ref 1.8–7.4)
NEUTROPHILS NFR BLD AUTO: 73 % — SIGNIFICANT CHANGE UP (ref 43–77)
PLATELET # BLD AUTO: 254 K/UL — SIGNIFICANT CHANGE UP (ref 150–400)
POTASSIUM SERPL-MCNC: 3.6 MMOL/L — SIGNIFICANT CHANGE UP (ref 3.5–5.3)
POTASSIUM SERPL-SCNC: 3.6 MMOL/L — SIGNIFICANT CHANGE UP (ref 3.5–5.3)
PROT SERPL-MCNC: 8.1 G/DL — SIGNIFICANT CHANGE UP (ref 6–8.3)
RBC # BLD: 4.09 M/UL — SIGNIFICANT CHANGE UP (ref 3.8–5.2)
RBC # FLD: 11.6 % — SIGNIFICANT CHANGE UP (ref 10.3–14.5)
SODIUM SERPL-SCNC: 144 MMOL/L — SIGNIFICANT CHANGE UP (ref 135–145)
WBC # BLD: 11.3 K/UL — HIGH (ref 3.8–10.5)
WBC # FLD AUTO: 11.3 K/UL — HIGH (ref 3.8–10.5)

## 2019-01-12 PROCEDURE — 96374 THER/PROPH/DIAG INJ IV PUSH: CPT | Mod: XU

## 2019-01-12 PROCEDURE — 85027 COMPLETE CBC AUTOMATED: CPT

## 2019-01-12 PROCEDURE — 74177 CT ABD & PELVIS W/CONTRAST: CPT

## 2019-01-12 PROCEDURE — 84484 ASSAY OF TROPONIN QUANT: CPT

## 2019-01-12 PROCEDURE — 74177 CT ABD & PELVIS W/CONTRAST: CPT | Mod: 26

## 2019-01-12 PROCEDURE — 99284 EMERGENCY DEPT VISIT MOD MDM: CPT

## 2019-01-12 PROCEDURE — 96361 HYDRATE IV INFUSION ADD-ON: CPT

## 2019-01-12 PROCEDURE — 99284 EMERGENCY DEPT VISIT MOD MDM: CPT | Mod: 25

## 2019-01-12 PROCEDURE — 83690 ASSAY OF LIPASE: CPT

## 2019-01-12 PROCEDURE — 80053 COMPREHEN METABOLIC PANEL: CPT

## 2019-01-12 RX ORDER — ONDANSETRON 8 MG/1
4 TABLET, FILM COATED ORAL ONCE
Qty: 0 | Refills: 0 | Status: COMPLETED | OUTPATIENT
Start: 2019-01-12 | End: 2019-01-12

## 2019-01-12 RX ORDER — SODIUM CHLORIDE 9 MG/ML
1000 INJECTION, SOLUTION INTRAVENOUS ONCE
Qty: 0 | Refills: 0 | Status: COMPLETED | OUTPATIENT
Start: 2019-01-12 | End: 2019-01-12

## 2019-01-12 RX ORDER — ONDANSETRON 8 MG/1
1 TABLET, FILM COATED ORAL
Qty: 9 | Refills: 0
Start: 2019-01-12 | End: 2019-01-14

## 2019-01-12 RX ADMIN — SODIUM CHLORIDE 1000 MILLILITER(S): 9 INJECTION, SOLUTION INTRAVENOUS at 23:30

## 2019-01-12 RX ADMIN — SODIUM CHLORIDE 2000 MILLILITER(S): 9 INJECTION, SOLUTION INTRAVENOUS at 19:14

## 2019-01-12 RX ADMIN — ONDANSETRON 4 MILLIGRAM(S): 8 TABLET, FILM COATED ORAL at 20:38

## 2019-01-12 RX ADMIN — Medication 30 MILLILITER(S): at 23:34

## 2019-01-12 NOTE — ED ADULT NURSE NOTE - OBJECTIVE STATEMENT
83 year old female brought into the ED c/o abdominal pain. Patient reports the pain began two days ago. Pt describes the pain to be a dull, constant aching pain that diffuses throughout her entire abdomen. Patient has a PMH of HDL, mitral valve prolapse, HTN, COPD. Upon examination, patient is a/ox3, vitals stable. Patient reports she is unable to keep anything PO down and has been vomiting for the past 3 days. Upon palpation, abdomen is soft and distended. Patient states that her last BM was three days ago. Patient complains of nausea and vomiting and lightheadedness.  Patient deines fever/chills/diarrhea/weakness/numbness/tingling. Patient denies SOB/chest pain. Patients lung sounds are clear b/l and unlabored. Patients skin is warm and dry and intact. Patients  is at bedside. Patient has a 20G IV in her left AC.

## 2019-01-12 NOTE — ED PROVIDER NOTE - MEDICAL DECISION MAKING DETAILS
Milan Burns (Resident): 82 y/o concern for SBO - will check labs, lipase, and CT a/P - looks well, normal vitals, non toxic appearing, not peritoneal/surgical abd,

## 2019-01-12 NOTE — ED PROVIDER NOTE - PROGRESS NOTE DETAILS
Taina Morales M.D. Resident  The patient leaving against medical advice. The patient is AOx4 and understands the risk of leaving without further evaluation, including permanant disability and death. The risks, benefits, hazard, alternatives and precautions were reviewed with the patient and the patient voices understanding. The patient however still declines further workup in the Emergency Department and agrees to close follow up with his/her PMD. The patient understands he/she can return to the Emergency Department any time. Attending MD Mauricio.  Pt was seen, examined and managed by myself and resident Dr. Morales.  On arrival her plan of care was explained.  Shortly after her CT results were obtained pt has become irate stating that she was not seen by a doctor and wants to leave.  Pt endorses persistent diffuse upper abdominal pain.  She has been extensively counseled re: concern for poss occult biliary disease vs. occult cardiac disease.  Pt is A & O x 4 however is extremely angry about her stay in ED.  I have asked how I can assist her and how I can address her frustration to enable her to receive continued care.  Pt states that she will go to another facility.  She has been educated re: concern that if she has a cardiac/cholecystic problem, she may have Attending MD Mauricio.  Pt was seen, examined and managed by myself and resident Dr. Morales.  On arrival her plan of care was explained.  Shortly after her CT results were obtained pt has become irate stating that she was not seen by a doctor and wants to leave.  Pt endorses persistent diffuse upper abdominal pain.  She is very frustrated that she has been in ED for multiple hours.  I have apologized that she is frustrated and have explained that CT scans take time but am glad that hers does not demonstrate a severe finding.  Have offered any assistance to make her more comfortable to enable ongoing work-up.  She has been extensively counseled re: concern for poss occult biliary disease vs. occult cardiac disease.  Pt is A & O x 4 however is extremely angry about her stay in ED.  I have asked how I can assist her and how I can address her frustration to enable her to receive continued care.  Pt states that she will go to another facility.  She has been educated re: concern that if she has a cardiac/cholecystic problem, she may progress to an unstable or unsafe condition by the time she can be seen elsewhere.  pt has been reassured that if she is willing to remain in ED for additional work-up we will be happy to provide continued care.  Pt continues to refuse care.  AMA paperwork complete.  Pt has signed AMA paperwork and then torn it up.  Pt requesting nausea medication.  She has been provided with a script for zofran but has been educated that any symptom control by zofran does not treat or reduce risk from any underlying pathology.  Pt verbalizes understanding of the above.  Stable for discharge AMA. Attending MD Mauricio.  Pt was seen, examined and managed by myself and resident Dr. Burns and Dr. Morales.  On arrival her plan of care was explained.  Shortly after her CT results were obtained pt has become irate stating that she was not seen by a doctor and wants to leave.  Pt endorses persistent diffuse upper abdominal pain.  She is very frustrated that she has been in ED for multiple hours.  I have apologized that she is frustrated and have explained that CT scans take time but am glad that hers does not demonstrate a severe finding.  Have offered any assistance to make her more comfortable to enable ongoing work-up.  She has been extensively counseled re: concern for poss occult biliary disease vs. occult cardiac disease.  Pt is A & O x 4 however is extremely angry about her stay in ED.  I have asked how I can assist her and how I can address her frustration to enable her to receive continued care.  Pt states that she will go to another facility.  She has been educated re: concern that if she has a cardiac/cholecystic problem, she may progress to an unstable or unsafe condition by the time she can be seen elsewhere.  pt has been reassured that if she is willing to remain in ED for additional work-up we will be happy to provide continued care.  Pt continues to refuse care.  AMA paperwork complete.  Pt has signed AMA paperwork and then torn it up.  Pt requesting nausea medication.  She has been provided with a script for zofran but has been educated that any symptom control by zofran does not treat or reduce risk from any underlying pathology.  Pt verbalizes understanding of the above.  Stable for discharge AMA. Attending MD Mauricio.  Pt was seen, examined and managed by myself and resident Dr. Burns and Dr. Morales.  On arrival her plan of care was explained.  Shortly after her CT results were obtained pt has become irate stating that she was not seen by a doctor and wants to leave.  Pt endorses persistent diffuse upper abdominal pain.  She is very frustrated that she has been in ED for multiple hours.  I have apologized that she is frustrated and have explained that CT scans take time but am glad that hers does not demonstrate a severe finding.  Have offered any assistance to make her more comfortable to enable ongoing work-up.  She has been extensively counseled re: concern for poss occult biliary disease vs. occult cardiac disease.  Pt is A & O x 4 with decisional capacity however is extremely angry about her stay in ED.  I have asked how I can assist her and how I can address her frustration to enable her to receive continued care.  Pt states that she will go to another facility.  She has been educated re: concern that if she has a cardiac/cholecystic problem, she may progress to an unstable or unsafe condition by the time she can be seen elsewhere.  pt has been reassured that if she is willing to remain in ED for additional work-up we will be happy to provide continued care.  Pt continues to refuse care.  AMA paperwork complete.  Pt has signed AMA paperwork and then torn it up.  Pt requesting nausea medication.  She has been provided with a script for zofran but has been educated that any symptom control by zofran does not treat or reduce risk from any underlying pathology.  Pt verbalizes understanding of the above.  Stable for discharge AMA.

## 2019-01-12 NOTE — ED ADULT NURSE REASSESSMENT NOTE - NS ED NURSE REASSESS COMMENT FT1
pt states 'no one has seen me, no doctor or nurse has seen me, nothing has been done for me'. Pt assessed by day MD, night MD, day RN, and night RN. at 1900, pt stated 'I am not in pain, but I feel nauseas'. Zofran administered with relief. Vitals stable. Pt expressed anger because CT scan results 'took too much time'. RN and MD used deescalation techniques and explained plan of care for patient. VSS. Will continue to monitor and assess while offering support and reassurance.

## 2019-01-12 NOTE — ED PROVIDER NOTE - ATTENDING CONTRIBUTION TO CARE
Attending MD Mauricio.  Agree with above.  Pt is an 84 yo female with pmhx of HTN, HLD, hypothyroidism.  Appendectomy remotely, no BM x sev'l days with band like abdominal pain, belching, vomiting.  Non-toxic in appearance but any PO results in vomiting.  Pt is otherwise well appearing.  Differential includes sig constipation vs. bowel obstruction.

## 2019-01-12 NOTE — ED PROVIDER NOTE - OBJECTIVE STATEMENT
82 y/o female hx of HTN HLD And Hypothyroid p./w abd pain, distension, belching, and vomiting. Per patient, 2 days of abd pain and early satiety. States went to urgent care for vomiting and no BM, and told to come to ED to r/o a bowel obstruction. Hx of appendectomy when she was younger, no other surgeries. States pain in the epigastric region, band-like, non radiating. No CP, diarrhea, fevers. Not nauseous at this time.

## 2019-01-12 NOTE — ED PROVIDER NOTE - GASTROINTESTINAL, MLM
Abdomen soft, mild tenderness in epigastrium w/o guarding. right lower quadrant well healed surgical scar.

## 2019-01-13 NOTE — ED ADULT NURSE REASSESSMENT NOTE - NS ED NURSE REASSESS COMMENT FT1
pt signed in AMA paperwork in front of MD and RN, then proceeded to rip up the paper work. MD discussed discharge instructions, and reasons why patient should stay for further testing. pt refused, VSS.

## 2019-04-03 ENCOUNTER — LABORATORY RESULT (OUTPATIENT)
Age: 84
End: 2019-04-03

## 2019-04-03 ENCOUNTER — NON-APPOINTMENT (OUTPATIENT)
Age: 84
End: 2019-04-03

## 2019-04-03 ENCOUNTER — APPOINTMENT (OUTPATIENT)
Dept: CARDIOLOGY | Facility: CLINIC | Age: 84
End: 2019-04-03
Payer: MEDICARE

## 2019-04-03 VITALS — OXYGEN SATURATION: 96 % | SYSTOLIC BLOOD PRESSURE: 110 MMHG | DIASTOLIC BLOOD PRESSURE: 60 MMHG | HEART RATE: 60 BPM

## 2019-04-03 VITALS — DIASTOLIC BLOOD PRESSURE: 70 MMHG | SYSTOLIC BLOOD PRESSURE: 118 MMHG

## 2019-04-03 VITALS — BODY MASS INDEX: 25.7 KG/M2 | WEIGHT: 136 LBS

## 2019-04-03 PROCEDURE — 93040 RHYTHM ECG WITH REPORT: CPT | Mod: 59

## 2019-04-03 PROCEDURE — 93000 ELECTROCARDIOGRAM COMPLETE: CPT

## 2019-04-03 PROCEDURE — 99214 OFFICE O/P EST MOD 30 MIN: CPT

## 2019-04-03 PROCEDURE — 36415 COLL VENOUS BLD VENIPUNCTURE: CPT

## 2019-04-03 NOTE — HISTORY OF PRESENT ILLNESS
[FreeTextEntry1] : She denies chest pain, shortness of breath, and palpitations.\par Although she denies dyspnea, she is less active as she is limited by her .

## 2019-05-04 LAB
ALBUMIN SERPL ELPH-MCNC: 4 G/DL
ALP BLD-CCNC: 77 U/L
ALT SERPL-CCNC: 12 U/L
ANION GAP SERPL CALC-SCNC: 16 MMOL/L
AST SERPL-CCNC: 14 U/L
BILIRUB SERPL-MCNC: 0.6 MG/DL
BUN SERPL-MCNC: 20 MG/DL
CALCIUM SERPL-MCNC: 9.1 MG/DL
CHLORIDE SERPL-SCNC: 104 MMOL/L
CHOLEST SERPL-MCNC: 136 MG/DL
CHOLEST/HDLC SERPL: 2.8 RATIO
CO2 SERPL-SCNC: 23 MMOL/L
CREAT SERPL-MCNC: 0.68 MG/DL
HDLC SERPL-MCNC: 48 MG/DL
LDLC SERPL CALC-MCNC: 65 MG/DL
LDLC SERPL DIRECT ASSAY-MCNC: 73 MG/DL
POTASSIUM SERPL-SCNC: 4.3 MMOL/L
PROT SERPL-MCNC: 7.5 G/DL
SODIUM SERPL-SCNC: 143 MMOL/L
T3 SERPL-MCNC: 59 NG/DL
T3RU NFR SERPL: 1 TBI
T4 FREE SERPL-MCNC: 1.2 NG/DL
T4 SERPL-MCNC: 6.4 UG/DL
TRIGL SERPL-MCNC: 113 MG/DL
TSH SERPL-ACNC: 12.6 UIU/ML

## 2019-05-16 ENCOUNTER — APPOINTMENT (OUTPATIENT)
Dept: GASTROENTEROLOGY | Facility: CLINIC | Age: 84
End: 2019-05-16
Payer: MEDICARE

## 2019-05-16 ENCOUNTER — LABORATORY RESULT (OUTPATIENT)
Age: 84
End: 2019-05-16

## 2019-05-16 ENCOUNTER — TRANSCRIPTION ENCOUNTER (OUTPATIENT)
Age: 84
End: 2019-05-16

## 2019-05-16 VITALS
HEIGHT: 61 IN | SYSTOLIC BLOOD PRESSURE: 116 MMHG | HEART RATE: 71 BPM | OXYGEN SATURATION: 89 % | TEMPERATURE: 98.1 F | DIASTOLIC BLOOD PRESSURE: 76 MMHG

## 2019-05-16 DIAGNOSIS — R11.2 NAUSEA WITH VOMITING, UNSPECIFIED: ICD-10-CM

## 2019-05-16 PROCEDURE — 99214 OFFICE O/P EST MOD 30 MIN: CPT | Mod: 25,PD

## 2019-05-16 PROCEDURE — 36415 COLL VENOUS BLD VENIPUNCTURE: CPT | Mod: PD

## 2019-05-16 NOTE — REVIEW OF SYSTEMS
[As Noted in HPI] : as noted in HPI [Fever] : no fever [Heart Rate Is Slow] : the heart rate was not slow [Heart Rate Is Fast] : the heart rate was not fast [Shortness Of Breath] : no shortness of breath [Cough] : no cough [Arthralgias] : no arthralgias [Joint Pain] : no joint pain [Change In A Mole] : no change in a mole [Dizziness] : no dizziness [Anxiety] : no anxiety [Depression] : no depression [Easy Bleeding] : no tendency for easy bleeding [Easy Bruising] : no tendency for easy bruising

## 2019-05-16 NOTE — HISTORY OF PRESENT ILLNESS
[FreeTextEntry1] : 84 yo female with c/o nausea and vomiting for  3days, today no further vomiting. no f/c/ns.  diffuse abdominal pain before vomiting, pain currently 2/10 pain scale, described as pressure, oncstant unrelated to food. unrelated to bm. Able to tolerate water only.last bm last week. Passing flatus.\par \par 05/2018 colonoscopy colon polyps\par \par s/p egd gastritis,

## 2019-05-16 NOTE — ASSESSMENT
[FreeTextEntry1] : Acute n/v for 3 days,passing flatus, nontender ,nondistended abdomen. probable acute gastritis,viral syndrome. \par Last episode vomitng earlier this am.\par \par Plan zofran \par         encourage hydration with salt containing liquid ,broth,gatorade\par         omeprazole daily\par           if no improvement of symptoms recommend ct scan abd/pelvis r/o partial sbo\par        labs today

## 2019-05-16 NOTE — PHYSICAL EXAM
[General Appearance - Alert] : alert [General Appearance - In No Acute Distress] : in no acute distress [General Appearance - Well Nourished] : well nourished [General Appearance - Well Developed] : well developed [FreeTextEntry1] : thin,uncomfortable [Sclera] : the sclera and conjunctiva were normal [Respiration, Rhythm And Depth] : normal respiratory rhythm and effort [Auscultation Breath Sounds / Voice Sounds] : lungs were clear to auscultation bilaterally [Heart Rate And Rhythm] : heart rate was normal and rhythm regular [Heart Sounds] : normal S1 and S2 [Bowel Sounds] : normal bowel sounds [Abdomen Soft] : soft [Abdomen Tenderness] : non-tender [No CVA Tenderness] : no ~M costovertebral angle tenderness [Abnormal Walk] : normal gait [Nail Clubbing] : no clubbing  or cyanosis of the fingernails [] : no rash [Skin Lesions] : no skin lesions [Oriented To Time, Place, And Person] : oriented to person, place, and time

## 2019-05-17 ENCOUNTER — INPATIENT (INPATIENT)
Facility: HOSPITAL | Age: 84
LOS: 1 days | Discharge: HOME | DRG: 351 | End: 2019-05-19
Attending: SURGERY | Admitting: SURGERY
Payer: MEDICARE

## 2019-05-17 ENCOUNTER — RESULT REVIEW (OUTPATIENT)
Age: 84
End: 2019-05-17

## 2019-05-17 VITALS
WEIGHT: 134.92 LBS | RESPIRATION RATE: 18 BRPM | HEART RATE: 82 BPM | SYSTOLIC BLOOD PRESSURE: 129 MMHG | OXYGEN SATURATION: 95 % | DIASTOLIC BLOOD PRESSURE: 81 MMHG | TEMPERATURE: 98 F | HEIGHT: 66 IN

## 2019-05-17 DIAGNOSIS — K56.609 UNSPECIFIED INTESTINAL OBSTRUCTION, UNSPECIFIED AS TO PARTIAL VERSUS COMPLETE OBSTRUCTION: ICD-10-CM

## 2019-05-17 DIAGNOSIS — Z90.49 ACQUIRED ABSENCE OF OTHER SPECIFIED PARTS OF DIGESTIVE TRACT: Chronic | ICD-10-CM

## 2019-05-17 LAB
ALBUMIN SERPL ELPH-MCNC: 4.2 G/DL — SIGNIFICANT CHANGE UP (ref 3.3–5)
ALBUMIN SERPL ELPH-MCNC: 4.6 G/DL
ALP BLD-CCNC: 81 U/L
ALP SERPL-CCNC: 69 U/L — SIGNIFICANT CHANGE UP (ref 40–120)
ALT FLD-CCNC: 10 U/L — SIGNIFICANT CHANGE UP (ref 10–45)
ALT SERPL-CCNC: 15 U/L
ANION GAP SERPL CALC-SCNC: 19 MMOL/L — HIGH (ref 5–17)
ANION GAP SERPL CALC-SCNC: 22 MMOL/L
APTT BLD: 22.1 SEC — LOW (ref 27.5–36.3)
AST SERPL-CCNC: 14 U/L — SIGNIFICANT CHANGE UP (ref 10–40)
AST SERPL-CCNC: 15 U/L
BASOPHILS # BLD AUTO: 0 K/UL — SIGNIFICANT CHANGE UP (ref 0–0.2)
BASOPHILS # BLD AUTO: 0.02 K/UL
BASOPHILS NFR BLD AUTO: 0.2 %
BILIRUB SERPL-MCNC: 0.7 MG/DL
BILIRUB SERPL-MCNC: 0.8 MG/DL — SIGNIFICANT CHANGE UP (ref 0.2–1.2)
BLD GP AB SCN SERPL QL: NEGATIVE — SIGNIFICANT CHANGE UP
BUN SERPL-MCNC: 57 MG/DL
BUN SERPL-MCNC: 77 MG/DL — HIGH (ref 7–23)
CALCIUM SERPL-MCNC: 8.7 MG/DL — SIGNIFICANT CHANGE UP (ref 8.4–10.5)
CALCIUM SERPL-MCNC: 9.3 MG/DL
CHLORIDE SERPL-SCNC: 84 MMOL/L — LOW (ref 96–108)
CHLORIDE SERPL-SCNC: 87 MMOL/L
CO2 SERPL-SCNC: 30 MMOL/L
CO2 SERPL-SCNC: 32 MMOL/L — HIGH (ref 22–31)
CREAT SERPL-MCNC: 1.34 MG/DL
CREAT SERPL-MCNC: 1.69 MG/DL — HIGH (ref 0.5–1.3)
EOSINOPHIL # BLD AUTO: 0 K/UL
EOSINOPHIL # BLD AUTO: 0 K/UL — SIGNIFICANT CHANGE UP (ref 0–0.5)
EOSINOPHIL NFR BLD AUTO: 0 %
GAS PNL BLDV: SIGNIFICANT CHANGE UP
GLUCOSE SERPL-MCNC: 141 MG/DL — HIGH (ref 70–99)
GLUCOSE SERPL-MCNC: 142 MG/DL
HCT VFR BLD CALC: 39.9 % — SIGNIFICANT CHANGE UP (ref 34.5–45)
HCT VFR BLD CALC: 40.9 %
HGB BLD-MCNC: 12.9 G/DL
HGB BLD-MCNC: 13.5 G/DL — SIGNIFICANT CHANGE UP (ref 11.5–15.5)
IMM GRANULOCYTES NFR BLD AUTO: 0.3 %
INR BLD: 1.01 RATIO — SIGNIFICANT CHANGE UP (ref 0.88–1.16)
LIDOCAIN IGE QN: 150 U/L — HIGH (ref 7–60)
LYMPHOCYTES # BLD AUTO: 1 % — LOW (ref 13–44)
LYMPHOCYTES # BLD AUTO: 1.6 K/UL — SIGNIFICANT CHANGE UP (ref 1–3.3)
LYMPHOCYTES # BLD AUTO: 2.01 K/UL
LYMPHOCYTES NFR BLD AUTO: 16.6 %
MAGNESIUM SERPL-MCNC: 1.7 MG/DL — SIGNIFICANT CHANGE UP (ref 1.6–2.6)
MAN DIFF?: NORMAL
MCHC RBC-ENTMCNC: 31.5 GM/DL
MCHC RBC-ENTMCNC: 32 PG
MCHC RBC-ENTMCNC: 32.8 PG — SIGNIFICANT CHANGE UP (ref 27–34)
MCHC RBC-ENTMCNC: 33.8 GM/DL — SIGNIFICANT CHANGE UP (ref 32–36)
MCV RBC AUTO: 101.5 FL
MCV RBC AUTO: 97.2 FL — SIGNIFICANT CHANGE UP (ref 80–100)
MONOCYTES # BLD AUTO: 1.83 K/UL
MONOCYTES # BLD AUTO: 1.9 K/UL — HIGH (ref 0–0.9)
MONOCYTES NFR BLD AUTO: 15.1 %
MONOCYTES NFR BLD AUTO: 17 % — HIGH (ref 2–14)
NEUTROPHILS # BLD AUTO: 7 K/UL — SIGNIFICANT CHANGE UP (ref 1.8–7.4)
NEUTROPHILS # BLD AUTO: 8.22 K/UL
NEUTROPHILS NFR BLD AUTO: 67.8 %
NEUTROPHILS NFR BLD AUTO: 77 % — SIGNIFICANT CHANGE UP (ref 43–77)
PHOSPHATE SERPL-MCNC: 6.6 MG/DL — HIGH (ref 2.5–4.5)
PLATELET # BLD AUTO: 256 K/UL — SIGNIFICANT CHANGE UP (ref 150–400)
PLATELET # BLD AUTO: 289 K/UL
POTASSIUM SERPL-MCNC: 3.7 MMOL/L — SIGNIFICANT CHANGE UP (ref 3.5–5.3)
POTASSIUM SERPL-SCNC: 3.7 MMOL/L — SIGNIFICANT CHANGE UP (ref 3.5–5.3)
POTASSIUM SERPL-SCNC: 4.5 MMOL/L
PROT SERPL-MCNC: 7.8 G/DL — SIGNIFICANT CHANGE UP (ref 6–8.3)
PROT SERPL-MCNC: 7.9 G/DL
PROTHROM AB SERPL-ACNC: 11.6 SEC — SIGNIFICANT CHANGE UP (ref 10–12.9)
RBC # BLD: 4.03 M/UL
RBC # BLD: 4.1 M/UL — SIGNIFICANT CHANGE UP (ref 3.8–5.2)
RBC # FLD: 12.8 % — SIGNIFICANT CHANGE UP (ref 10.3–14.5)
RBC # FLD: 14.3 %
RH IG SCN BLD-IMP: POSITIVE — SIGNIFICANT CHANGE UP
RH IG SCN BLD-IMP: POSITIVE — SIGNIFICANT CHANGE UP
SODIUM SERPL-SCNC: 135 MMOL/L — SIGNIFICANT CHANGE UP (ref 135–145)
SODIUM SERPL-SCNC: 139 MMOL/L
WBC # BLD: 10.6 K/UL — HIGH (ref 3.8–10.5)
WBC # FLD AUTO: 10.6 K/UL — HIGH (ref 3.8–10.5)
WBC # FLD AUTO: 12.12 K/UL

## 2019-05-17 PROCEDURE — 49553 RPR FEM HERNIA INIT BLOCKED: CPT

## 2019-05-17 PROCEDURE — 43753 TX GASTRO INTUB W/ASP: CPT | Mod: GC

## 2019-05-17 PROCEDURE — 93010 ELECTROCARDIOGRAM REPORT: CPT | Mod: 59,GC

## 2019-05-17 PROCEDURE — 99285 EMERGENCY DEPT VISIT HI MDM: CPT | Mod: 25,GC

## 2019-05-17 PROCEDURE — 74176 CT ABD & PELVIS W/O CONTRAST: CPT | Mod: 26

## 2019-05-17 PROCEDURE — 74019 RADEX ABDOMEN 2 VIEWS: CPT | Mod: 26

## 2019-05-17 RX ORDER — OMEPRAZOLE 10 MG/1
1 CAPSULE, DELAYED RELEASE ORAL
Qty: 0 | Refills: 0 | DISCHARGE

## 2019-05-17 RX ORDER — SODIUM CHLORIDE 9 MG/ML
1000 INJECTION, SOLUTION INTRAVENOUS
Refills: 0 | Status: DISCONTINUED | OUTPATIENT
Start: 2019-05-17 | End: 2019-05-17

## 2019-05-17 RX ORDER — AMLODIPINE BESYLATE 2.5 MG/1
1 TABLET ORAL
Qty: 0 | Refills: 0 | DISCHARGE

## 2019-05-17 RX ORDER — HEPARIN SODIUM 5000 [USP'U]/ML
5000 INJECTION INTRAVENOUS; SUBCUTANEOUS EVERY 8 HOURS
Refills: 0 | Status: DISCONTINUED | OUTPATIENT
Start: 2019-05-17 | End: 2019-05-19

## 2019-05-17 RX ORDER — MORPHINE SULFATE 50 MG/1
4 CAPSULE, EXTENDED RELEASE ORAL EVERY 4 HOURS
Refills: 0 | Status: DISCONTINUED | OUTPATIENT
Start: 2019-05-17 | End: 2019-05-19

## 2019-05-17 RX ORDER — ONDANSETRON 8 MG/1
4 TABLET, FILM COATED ORAL ONCE
Refills: 0 | Status: COMPLETED | OUTPATIENT
Start: 2019-05-17 | End: 2019-05-17

## 2019-05-17 RX ORDER — HYDROMORPHONE HYDROCHLORIDE 2 MG/ML
0.25 INJECTION INTRAMUSCULAR; INTRAVENOUS; SUBCUTANEOUS
Refills: 0 | Status: DISCONTINUED | OUTPATIENT
Start: 2019-05-17 | End: 2019-05-17

## 2019-05-17 RX ORDER — BUDESONIDE AND FORMOTEROL FUMARATE DIHYDRATE 160; 4.5 UG/1; UG/1
2 AEROSOL RESPIRATORY (INHALATION)
Refills: 0 | Status: DISCONTINUED | OUTPATIENT
Start: 2019-05-17 | End: 2019-05-17

## 2019-05-17 RX ORDER — METOPROLOL TARTRATE 50 MG
5 TABLET ORAL EVERY 6 HOURS
Refills: 0 | Status: DISCONTINUED | OUTPATIENT
Start: 2019-05-17 | End: 2019-05-19

## 2019-05-17 RX ORDER — MORPHINE SULFATE 50 MG/1
2 CAPSULE, EXTENDED RELEASE ORAL ONCE
Refills: 0 | Status: DISCONTINUED | OUTPATIENT
Start: 2019-05-17 | End: 2019-05-17

## 2019-05-17 RX ORDER — FLUTICASONE PROPIONATE 50 MCG
2 SPRAY, SUSPENSION NASAL DAILY
Refills: 0 | Status: DISCONTINUED | OUTPATIENT
Start: 2019-05-17 | End: 2019-05-19

## 2019-05-17 RX ORDER — SODIUM CHLORIDE 9 MG/ML
1000 INJECTION, SOLUTION INTRAVENOUS ONCE
Refills: 0 | Status: COMPLETED | OUTPATIENT
Start: 2019-05-17 | End: 2019-05-17

## 2019-05-17 RX ORDER — MONTELUKAST 4 MG/1
1 TABLET, CHEWABLE ORAL
Qty: 0 | Refills: 0 | DISCHARGE

## 2019-05-17 RX ORDER — LEVOTHYROXINE SODIUM 125 MCG
0 TABLET ORAL
Qty: 0 | Refills: 0 | DISCHARGE

## 2019-05-17 RX ORDER — MOMETASONE FUROATE 50 UG/1
2 SPRAY NASAL
Qty: 0 | Refills: 0 | DISCHARGE

## 2019-05-17 RX ORDER — ROSUVASTATIN CALCIUM 5 MG/1
1 TABLET ORAL
Qty: 0 | Refills: 0 | DISCHARGE

## 2019-05-17 RX ORDER — BUDESONIDE AND FORMOTEROL FUMARATE DIHYDRATE 160; 4.5 UG/1; UG/1
2 AEROSOL RESPIRATORY (INHALATION) DAILY
Refills: 0 | Status: DISCONTINUED | OUTPATIENT
Start: 2019-05-17 | End: 2019-05-19

## 2019-05-17 RX ORDER — LEVOTHYROXINE SODIUM 125 MCG
75 TABLET ORAL AT BEDTIME
Refills: 0 | Status: DISCONTINUED | OUTPATIENT
Start: 2019-05-17 | End: 2019-05-19

## 2019-05-17 RX ORDER — FLUTICASONE PROPIONATE AND SALMETEROL 50; 250 UG/1; UG/1
1 POWDER ORAL; RESPIRATORY (INHALATION)
Qty: 0 | Refills: 0 | DISCHARGE

## 2019-05-17 RX ORDER — ONDANSETRON 8 MG/1
4 TABLET, FILM COATED ORAL ONCE
Refills: 0 | Status: DISCONTINUED | OUTPATIENT
Start: 2019-05-17 | End: 2019-05-17

## 2019-05-17 RX ORDER — SODIUM CHLORIDE 9 MG/ML
500 INJECTION, SOLUTION INTRAVENOUS ONCE
Refills: 0 | Status: COMPLETED | OUTPATIENT
Start: 2019-05-17 | End: 2019-05-17

## 2019-05-17 RX ORDER — NADOLOL 80 MG/1
1 TABLET ORAL
Qty: 0 | Refills: 0 | DISCHARGE

## 2019-05-17 RX ORDER — SODIUM CHLORIDE 9 MG/ML
1000 INJECTION, SOLUTION INTRAVENOUS
Refills: 0 | Status: DISCONTINUED | OUTPATIENT
Start: 2019-05-17 | End: 2019-05-18

## 2019-05-17 RX ORDER — MONTELUKAST 4 MG/1
10 TABLET, CHEWABLE ORAL DAILY
Refills: 0 | Status: DISCONTINUED | OUTPATIENT
Start: 2019-05-17 | End: 2019-05-19

## 2019-05-17 RX ORDER — ACETAMINOPHEN 500 MG
750 TABLET ORAL ONCE
Refills: 0 | Status: COMPLETED | OUTPATIENT
Start: 2019-05-17 | End: 2019-05-18

## 2019-05-17 RX ORDER — ONDANSETRON 8 MG/1
4 TABLET, FILM COATED ORAL EVERY 6 HOURS
Refills: 0 | Status: DISCONTINUED | OUTPATIENT
Start: 2019-05-17 | End: 2019-05-19

## 2019-05-17 RX ORDER — VENLAFAXINE HCL 75 MG
1 CAPSULE, EXT RELEASE 24 HR ORAL
Qty: 0 | Refills: 0 | DISCHARGE

## 2019-05-17 RX ORDER — DESLORATADINE 5 MG/1
1 TABLET, FILM COATED ORAL
Qty: 0 | Refills: 0 | DISCHARGE

## 2019-05-17 RX ADMIN — SODIUM CHLORIDE 500 MILLILITER(S): 9 INJECTION, SOLUTION INTRAVENOUS at 12:06

## 2019-05-17 RX ADMIN — SODIUM CHLORIDE 100 MILLILITER(S): 9 INJECTION, SOLUTION INTRAVENOUS at 19:39

## 2019-05-17 RX ADMIN — SODIUM CHLORIDE 500 MILLILITER(S): 9 INJECTION, SOLUTION INTRAVENOUS at 14:45

## 2019-05-17 RX ADMIN — SODIUM CHLORIDE 2000 MILLILITER(S): 9 INJECTION, SOLUTION INTRAVENOUS at 16:15

## 2019-05-17 RX ADMIN — MORPHINE SULFATE 2 MILLIGRAM(S): 50 CAPSULE, EXTENDED RELEASE ORAL at 12:20

## 2019-05-17 RX ADMIN — HEPARIN SODIUM 5000 UNIT(S): 5000 INJECTION INTRAVENOUS; SUBCUTANEOUS at 22:00

## 2019-05-17 RX ADMIN — MORPHINE SULFATE 2 MILLIGRAM(S): 50 CAPSULE, EXTENDED RELEASE ORAL at 12:06

## 2019-05-17 RX ADMIN — ONDANSETRON 4 MILLIGRAM(S): 8 TABLET, FILM COATED ORAL at 12:06

## 2019-05-17 NOTE — H&P ADULT - ASSESSMENT
Assessment/Plan: 83y Female with SBO 2/2 left femoral hernia.   Hemodynamically stable, non-peritoneal. NGT with >2 liters of output.   Signs of dehydration on labs with JOVITA.     - Booked for emergent femoral hernia repair. Consent in chart.   - Receiving 2nd liter of fluids  - NPO, NGT  - Home medication reconciliation complete. Will restart PO medications when GI function returns.    Discussed with Dr. Prescott  Pager [phone]

## 2019-05-17 NOTE — ED PROVIDER NOTE - PROGRESS NOTE DETAILS
attending Hayley: NGT in place. Awaiting surgical consult. Admit to Dr Prescott. Give another 1L of LR, will be going to OR within 1 hour

## 2019-05-17 NOTE — H&P ADULT - NSHPSOCIALHISTORY_GEN_ALL_CORE
Former middle school  . Former smoker - 1.5 packs per day for approximately 20 years, quit in her 50's. Daily wine drinker - 1 glass of wine per day. Lives with . Independent in ADLs but has  for chores.

## 2019-05-17 NOTE — H&P ADULT - HISTORY OF PRESENT ILLNESS
83-year-old woman with COPD, s/p open appendectomy as a child presents with 4 days of diffuse abdominal pain with nausea. Saw a gastroenterologist prior to admission and was recommended to go to the ED. Does not remember last flatus; last BM was one week ago. No fevers/chills.   Was last seen in the ED in January for nausea and abdominal pain, and AMA'd. Has never noticed bulging in the groins before.   NGT had 2.3 liters of initial output.

## 2019-05-17 NOTE — ED PROVIDER NOTE - OBJECTIVE STATEMENT
84 yo F PMHx COPD, hypothyroidism, appendectomy 40 years ago p/w abd distention, N/V. Pt has not had a BM x 1 week. 84 yo F PMHx COPD, hypothyroidism, appendectomy 40 years ago p/w abd distention, N/V. Pt has not had a BM x 1 week. +Flatus. N/V stopped yesterday, pt had multiple episodes. Did not try to PO today. No fevers/chills, dysuria, hematuria.

## 2019-05-17 NOTE — ED ADULT NURSE NOTE - OBJECTIVE STATEMENT
83 year old female brought in by EMS for 3 days of N/V.  Patient was seen by her Primary MD who did blood work and told her to come to the ED for IV hydration yesterday.  She c/o generalized abdominal pain that is constant and is a 10/10 "there" pain and improved when she induces vomiting and worse when laying flat.  She denies: shortness of breath, chest pain, dizziness/lightheadedness, diarrhea, melena, bloody emesis, sick contacts, recent travel.  L.s diminished in lower lobes b/l, abdomen is distended and tender to touch in all quadrants, b/s hypoactive.  Bed in lowest position, yellow fall band on.

## 2019-05-17 NOTE — ED PROVIDER NOTE - ATTENDING CONTRIBUTION TO CARE
attending Hayley: 83yF COPD not on home O2, HTN, prior appendectomy (remote) p/w several days progressive abdominal pain and distention with nausea and multiple episodes of vomiting. Not tolerating PO. Not passing flatus. Last BM last week. On exam, ill appearing, dry MM, abdomen distended, minimally tender diffusely without r/g/r. Will obtain labs including vbg with lactate, axr, CT A/P, pain and nausea control, reassess.

## 2019-05-17 NOTE — H&P ADULT - NSHPPHYSICALEXAM_GEN_ALL_CORE
Vital Signs Last 24 Hrs  T(C): 36.6 (17 May 2019 16:06), Max: 36.6 (17 May 2019 16:06)  T(F): 97.8 (17 May 2019 16:06), Max: 97.8 (17 May 2019 16:06)  HR: 60 (17 May 2019 16:06) (60 - 82)  BP: 123/58 (17 May 2019 16:06) (123/58 - 154/82)  BP(mean): --  RR: 18 (17 May 2019 16:06) (16 - 18)  SpO2: 100% (17 May 2019 16:06) (95% - 100%)      General: A&Ox3, NAD.  Neuro: Motor and sensory grossly intact with no focal deficits.  HEENT: Anicteric sclerae.  Respiratory: Unlabored breathing.   CVS: Regular rate and rhythm.  Abdomen: Soft, non-distended, mild tenderness in left groin. Palpable left femoral hernia, non-reducible.  Extremities: Warm bilaterally.  MSK: Intact ROM.

## 2019-05-17 NOTE — ED ADULT NURSE NOTE - INTERVENTIONS DEFINITIONS
Stretcher in lowest position, wheels locked, appropriate side rails in place/Physically safe environment: no spills, clutter or unnecessary equipment/Provide visual cue, wrist band, yellow gown, etc.

## 2019-05-17 NOTE — CHART NOTE - NSCHARTNOTEFT_GEN_A_CORE
POST-OPERATIVE NOTE    Subjective:  Patient is s/p incarcerated femoral hernia reduction and repair. Recovering appropriately.     Vital Signs Last 24 Hrs  T(C): 37.1 (17 May 2019 22:53), Max: 37.1 (17 May 2019 22:53)  T(F): 98.8 (17 May 2019 22:53), Max: 98.8 (17 May 2019 22:53)  HR: 66 (17 May 2019 22:53) (60 - 82)  BP: 104/65 (17 May 2019 22:53) (104/65 - 154/82)  BP(mean): 74 (17 May 2019 21:45) (74 - 88)  RR: 16 (17 May 2019 22:53) (15 - 20)  SpO2: 96% (17 May 2019 22:53) (93% - 100%)  I&O's Detail    17 May 2019 07:01  -  17 May 2019 23:59  --------------------------------------------------------  IN:    lactated ringers.: 225 mL  Total IN: 225 mL    OUT:    Indwelling Catheter - Urethral: 320 mL    Nasoenteral Tube: 200 mL  Total OUT: 520 mL    Total NET: -295 mL        heparin  Injectable 5000  metoprolol tartrate Injectable 5    PAST MEDICAL & SURGICAL HISTORY:  Hypothyroid  COPD (chronic obstructive pulmonary disease)  History of appendectomy: open, childhood        Physical Exam:  General: NAD, resting comfortably in bed  Pulmonary: Nonlabored breathing, no respiratory distress  Abd: soft, ND, mininal ttp. Incision c/d/i.\  NGT in place    LABS:                        13.5   10.6  )-----------( 256      ( 17 May 2019 12:11 )             39.9     05-17    135  |  84<L>  |  77<H>  ----------------------------<  141<H>  3.7   |  32<H>  |  1.69<H>    Ca    8.7      17 May 2019 12:11  Phos  6.6     05-17  Mg     1.7     05-17    TPro  7.8  /  Alb  4.2  /  TBili  0.8  /  DBili  x   /  AST  14  /  ALT  10  /  AlkPhos  69  05-17    PT/INR - ( 17 May 2019 12:11 )   PT: 11.6 sec;   INR: 1.01 ratio         PTT - ( 17 May 2019 12:11 )  PTT:22.1 sec  CAPILLARY BLOOD GLUCOSE          Radiology and Additional Studies:    Assessment:  The patient is a 83y Female who is now several hours post-op from a incarcerated femoral hernia reduction and repair    Plan:  - Pain control as needed  - NPO, IVF  - NGT  - DVT ppx  - OOB and ambulating as tolerated  - home meds  - F/u AM labs

## 2019-05-17 NOTE — H&P ADULT - NSHPLABSRESULTS_GEN_ALL_CORE
CBC Full  -  ( 17 May 2019 12:11 )  WBC Count : 10.6 K/uL  RBC Count : 4.10 M/uL  Hemoglobin : 13.5 g/dL  Hematocrit : 39.9 %  Platelet Count - Automated : 256 K/uL  Mean Cell Volume : 97.2 fl  Mean Cell Hemoglobin : 32.8 pg  Mean Cell Hemoglobin Concentration : 33.8 gm/dL  Auto Neutrophil # : 7.0 K/uL  Auto Lymphocyte # : 1.6 K/uL  Auto Monocyte # : 1.9 K/uL  Auto Eosinophil # : 0.0 K/uL  Auto Basophil # : 0.0 K/uL  Auto Neutrophil % : 77.0 %  Auto Lymphocyte % : 1.0 %  Auto Monocyte % : 17.0 %  Auto Eosinophil % : x  Auto Basophil % : x    05-17    135  |  84<L>  |  77<H>  ----------------------------<  141<H>  3.7   |  32<H>  |  1.69<H>    Ca    8.7      17 May 2019 12:11  Phos  6.6     05-17  Mg     1.7     05-17    TPro  7.8  /  Alb  4.2  /  TBili  0.8  /  DBili  x   /  AST  14  /  ALT  10  /  AlkPhos  69  05-17    LIVER FUNCTIONS - ( 17 May 2019 12:11 )  Alb: 4.2 g/dL / Pro: 7.8 g/dL / ALK PHOS: 69 U/L / ALT: 10 U/L / AST: 14 U/L / GGT: x           PT/INR - ( 17 May 2019 12:11 )   PT: 11.6 sec;   INR: 1.01 ratio         PTT - ( 17 May 2019 12:11 )  PTT:22.1 sec        RADIOLOGY:  CT Abdomen and Pelvis No Cont (05.17.19 @ 13:53)     BOWEL: Circumferential thickening and fluid distention of the distal   esophagus. Small bowel obstruction with a transition point in a left   groin hernia. Colonic diverticulosis. Appendix is not visualized.  PERITONEUM: No ascites.  VESSELS:  Atherosclerosis.  RETROPERITONEUM: No lymphadenopathy.    ABDOMINAL WALL: Small left groin hernia containing small bowel with   surrounding inflammatory change.  BONES: S-shaped thoracolumbar scoliosis. Degenerative changes.    IMPRESSION:     Small bowel obstruction with transition point within a small left groin   hernia; uncertain if this represents inguinal or femoral hernia.

## 2019-05-17 NOTE — ED ADULT NURSE REASSESSMENT NOTE - NS ED NURSE REASSESS COMMENT FT1
Patient resting in stretcher with  at bedside declines pain medication at this time.  Bed in lowest position and call bell next to patient.

## 2019-05-18 LAB
ANION GAP SERPL CALC-SCNC: 15 MMOL/L — SIGNIFICANT CHANGE UP (ref 5–17)
BUN SERPL-MCNC: 50 MG/DL — HIGH (ref 7–23)
CALCIUM SERPL-MCNC: 7.7 MG/DL — LOW (ref 8.4–10.5)
CHLORIDE SERPL-SCNC: 91 MMOL/L — LOW (ref 96–108)
CO2 SERPL-SCNC: 33 MMOL/L — HIGH (ref 22–31)
CREAT SERPL-MCNC: 1.05 MG/DL — SIGNIFICANT CHANGE UP (ref 0.5–1.3)
GLUCOSE SERPL-MCNC: 126 MG/DL — HIGH (ref 70–99)
HCT VFR BLD CALC: 35.4 % — SIGNIFICANT CHANGE UP (ref 34.5–45)
HGB BLD-MCNC: 11.4 G/DL — LOW (ref 11.5–15.5)
MAGNESIUM SERPL-MCNC: 1.7 MG/DL — SIGNIFICANT CHANGE UP (ref 1.6–2.6)
MCHC RBC-ENTMCNC: 31.7 PG — SIGNIFICANT CHANGE UP (ref 27–34)
MCHC RBC-ENTMCNC: 32.2 GM/DL — SIGNIFICANT CHANGE UP (ref 32–36)
MCV RBC AUTO: 98.3 FL — SIGNIFICANT CHANGE UP (ref 80–100)
PHOSPHATE SERPL-MCNC: 3.8 MG/DL — SIGNIFICANT CHANGE UP (ref 2.5–4.5)
PLATELET # BLD AUTO: 212 K/UL — SIGNIFICANT CHANGE UP (ref 150–400)
POTASSIUM SERPL-MCNC: 3.5 MMOL/L — SIGNIFICANT CHANGE UP (ref 3.5–5.3)
POTASSIUM SERPL-SCNC: 3.5 MMOL/L — SIGNIFICANT CHANGE UP (ref 3.5–5.3)
RBC # BLD: 3.6 M/UL — LOW (ref 3.8–5.2)
RBC # FLD: 13.8 % — SIGNIFICANT CHANGE UP (ref 10.3–14.5)
SODIUM SERPL-SCNC: 139 MMOL/L — SIGNIFICANT CHANGE UP (ref 135–145)
WBC # BLD: 6.94 K/UL — SIGNIFICANT CHANGE UP (ref 3.8–10.5)
WBC # FLD AUTO: 6.94 K/UL — SIGNIFICANT CHANGE UP (ref 3.8–10.5)

## 2019-05-18 RX ORDER — PANTOPRAZOLE SODIUM 20 MG/1
40 TABLET, DELAYED RELEASE ORAL
Refills: 0 | Status: DISCONTINUED | OUTPATIENT
Start: 2019-05-18 | End: 2019-05-19

## 2019-05-18 RX ORDER — ATORVASTATIN CALCIUM 80 MG/1
40 TABLET, FILM COATED ORAL AT BEDTIME
Refills: 0 | Status: DISCONTINUED | OUTPATIENT
Start: 2019-05-18 | End: 2019-05-19

## 2019-05-18 RX ORDER — AMLODIPINE BESYLATE 2.5 MG/1
10 TABLET ORAL DAILY
Refills: 0 | Status: DISCONTINUED | OUTPATIENT
Start: 2019-05-18 | End: 2019-05-19

## 2019-05-18 RX ORDER — POTASSIUM CHLORIDE 20 MEQ
10 PACKET (EA) ORAL
Refills: 0 | Status: COMPLETED | OUTPATIENT
Start: 2019-05-18 | End: 2019-05-18

## 2019-05-18 RX ORDER — BUDESONIDE AND FORMOTEROL FUMARATE DIHYDRATE 160; 4.5 UG/1; UG/1
2 AEROSOL RESPIRATORY (INHALATION)
Refills: 0 | Status: DISCONTINUED | OUTPATIENT
Start: 2019-05-18 | End: 2019-05-19

## 2019-05-18 RX ORDER — DEXTROSE MONOHYDRATE, SODIUM CHLORIDE, AND POTASSIUM CHLORIDE 50; .745; 4.5 G/1000ML; G/1000ML; G/1000ML
1000 INJECTION, SOLUTION INTRAVENOUS
Refills: 0 | Status: DISCONTINUED | OUTPATIENT
Start: 2019-05-18 | End: 2019-05-19

## 2019-05-18 RX ORDER — VENLAFAXINE HCL 75 MG
150 CAPSULE, EXT RELEASE 24 HR ORAL DAILY
Refills: 0 | Status: DISCONTINUED | OUTPATIENT
Start: 2019-05-18 | End: 2019-05-19

## 2019-05-18 RX ORDER — LORATADINE 10 MG/1
10 TABLET ORAL DAILY
Refills: 0 | Status: DISCONTINUED | OUTPATIENT
Start: 2019-05-18 | End: 2019-05-19

## 2019-05-18 RX ORDER — MAGNESIUM SULFATE 500 MG/ML
2 VIAL (ML) INJECTION ONCE
Refills: 0 | Status: COMPLETED | OUTPATIENT
Start: 2019-05-18 | End: 2019-05-18

## 2019-05-18 RX ORDER — NADOLOL 80 MG/1
80 TABLET ORAL
Refills: 0 | Status: DISCONTINUED | OUTPATIENT
Start: 2019-05-18 | End: 2019-05-19

## 2019-05-18 RX ADMIN — HEPARIN SODIUM 5000 UNIT(S): 5000 INJECTION INTRAVENOUS; SUBCUTANEOUS at 05:49

## 2019-05-18 RX ADMIN — HEPARIN SODIUM 5000 UNIT(S): 5000 INJECTION INTRAVENOUS; SUBCUTANEOUS at 15:42

## 2019-05-18 RX ADMIN — Medication 100 MILLIEQUIVALENT(S): at 18:42

## 2019-05-18 RX ADMIN — SODIUM CHLORIDE 100 MILLILITER(S): 9 INJECTION, SOLUTION INTRAVENOUS at 10:44

## 2019-05-18 RX ADMIN — ATORVASTATIN CALCIUM 40 MILLIGRAM(S): 80 TABLET, FILM COATED ORAL at 22:04

## 2019-05-18 RX ADMIN — Medication 2 SPRAY(S): at 16:44

## 2019-05-18 RX ADMIN — Medication 50 GRAM(S): at 16:43

## 2019-05-18 RX ADMIN — Medication 100 MILLIEQUIVALENT(S): at 20:30

## 2019-05-18 RX ADMIN — HEPARIN SODIUM 5000 UNIT(S): 5000 INJECTION INTRAVENOUS; SUBCUTANEOUS at 22:04

## 2019-05-18 RX ADMIN — Medication 300 MILLIGRAM(S): at 13:41

## 2019-05-18 RX ADMIN — Medication 75 MICROGRAM(S): at 05:49

## 2019-05-18 RX ADMIN — BUDESONIDE AND FORMOTEROL FUMARATE DIHYDRATE 2 PUFF(S): 160; 4.5 AEROSOL RESPIRATORY (INHALATION) at 13:41

## 2019-05-18 RX ADMIN — Medication 5 MILLIGRAM(S): at 05:49

## 2019-05-18 RX ADMIN — Medication 100 MILLIEQUIVALENT(S): at 22:47

## 2019-05-18 RX ADMIN — MONTELUKAST 10 MILLIGRAM(S): 4 TABLET, CHEWABLE ORAL at 16:42

## 2019-05-18 RX ADMIN — Medication 5 MILLIGRAM(S): at 13:40

## 2019-05-18 NOTE — PROGRESS NOTE ADULT - ATTENDING COMMENTS
Pt seen and examined.  Chart reviewed.  Resident note confirmed.  Pt is an 83 year old female with a medical history significant for CAD/HTN/COPD s/p repair of an incarcerated hernia overnight. No intraoperative events.  NGT and whitt discontinued this morning.  O2 discontinued this morning. SaO2 on room air was 97%  PMH/PSH/MEDS/ALL/SH/FH/ROS:  Unchanged from H&P above  Vitals/PE/Labs/Radiographic data:  Reviewed     A/P  Neuro:  Groin pain  	PRN pain meds  	  CVS:	CAD/HTN  	Continue to monitor vitals    Pulm:  	COPD  Atelectasis  Continue ISP    GI:	Left femoral hernia repair  NPO  Await return of bowel fcn    :  	No active issues  	Monitor and replace lytes  	Monitor I’s and O’s    Heme:   No active issues  	Monitor H/h    ID: 	Culture for fever  Proph:	start DVT proph with SQ heparin

## 2019-05-19 ENCOUNTER — TRANSCRIPTION ENCOUNTER (OUTPATIENT)
Age: 84
End: 2019-05-19

## 2019-05-19 VITALS
DIASTOLIC BLOOD PRESSURE: 66 MMHG | RESPIRATION RATE: 18 BRPM | SYSTOLIC BLOOD PRESSURE: 118 MMHG | OXYGEN SATURATION: 100 % | TEMPERATURE: 98 F | HEART RATE: 58 BPM

## 2019-05-19 LAB
ANION GAP SERPL CALC-SCNC: 8 MMOL/L — SIGNIFICANT CHANGE UP (ref 5–17)
BUN SERPL-MCNC: 26 MG/DL — HIGH (ref 7–23)
CALCIUM SERPL-MCNC: 8.2 MG/DL — LOW (ref 8.4–10.5)
CHLORIDE SERPL-SCNC: 95 MMOL/L — LOW (ref 96–108)
CO2 SERPL-SCNC: 32 MMOL/L — HIGH (ref 22–31)
CREAT SERPL-MCNC: 0.65 MG/DL — SIGNIFICANT CHANGE UP (ref 0.5–1.3)
GLUCOSE SERPL-MCNC: 116 MG/DL — HIGH (ref 70–99)
HCT VFR BLD CALC: 32.3 % — LOW (ref 34.5–45)
HGB BLD-MCNC: 10.4 G/DL — LOW (ref 11.5–15.5)
MAGNESIUM SERPL-MCNC: 2.2 MG/DL — SIGNIFICANT CHANGE UP (ref 1.6–2.6)
MCHC RBC-ENTMCNC: 32.2 GM/DL — SIGNIFICANT CHANGE UP (ref 32–36)
MCHC RBC-ENTMCNC: 32.5 PG — SIGNIFICANT CHANGE UP (ref 27–34)
MCV RBC AUTO: 100.9 FL — HIGH (ref 80–100)
PHOSPHATE SERPL-MCNC: 1.5 MG/DL — LOW (ref 2.5–4.5)
PLATELET # BLD AUTO: 201 K/UL — SIGNIFICANT CHANGE UP (ref 150–400)
POTASSIUM SERPL-MCNC: 3.8 MMOL/L — SIGNIFICANT CHANGE UP (ref 3.5–5.3)
POTASSIUM SERPL-SCNC: 3.8 MMOL/L — SIGNIFICANT CHANGE UP (ref 3.5–5.3)
RBC # BLD: 3.2 M/UL — LOW (ref 3.8–5.2)
RBC # FLD: 13.6 % — SIGNIFICANT CHANGE UP (ref 10.3–14.5)
SODIUM SERPL-SCNC: 135 MMOL/L — SIGNIFICANT CHANGE UP (ref 135–145)
WBC # BLD: 6.56 K/UL — SIGNIFICANT CHANGE UP (ref 3.8–10.5)
WBC # FLD AUTO: 6.56 K/UL — SIGNIFICANT CHANGE UP (ref 3.8–10.5)

## 2019-05-19 PROCEDURE — 94640 AIRWAY INHALATION TREATMENT: CPT

## 2019-05-19 PROCEDURE — 85610 PROTHROMBIN TIME: CPT

## 2019-05-19 PROCEDURE — 74019 RADEX ABDOMEN 2 VIEWS: CPT

## 2019-05-19 PROCEDURE — 82803 BLOOD GASES ANY COMBINATION: CPT

## 2019-05-19 PROCEDURE — 74176 CT ABD & PELVIS W/O CONTRAST: CPT

## 2019-05-19 PROCEDURE — 84295 ASSAY OF SERUM SODIUM: CPT

## 2019-05-19 PROCEDURE — 86900 BLOOD TYPING SEROLOGIC ABO: CPT

## 2019-05-19 PROCEDURE — 84132 ASSAY OF SERUM POTASSIUM: CPT

## 2019-05-19 PROCEDURE — 80048 BASIC METABOLIC PNL TOTAL CA: CPT

## 2019-05-19 PROCEDURE — 84100 ASSAY OF PHOSPHORUS: CPT

## 2019-05-19 PROCEDURE — 85014 HEMATOCRIT: CPT

## 2019-05-19 PROCEDURE — 93005 ELECTROCARDIOGRAM TRACING: CPT | Mod: XU

## 2019-05-19 PROCEDURE — 85730 THROMBOPLASTIN TIME PARTIAL: CPT

## 2019-05-19 PROCEDURE — 82947 ASSAY GLUCOSE BLOOD QUANT: CPT

## 2019-05-19 PROCEDURE — 85027 COMPLETE CBC AUTOMATED: CPT

## 2019-05-19 PROCEDURE — 83690 ASSAY OF LIPASE: CPT

## 2019-05-19 PROCEDURE — 96374 THER/PROPH/DIAG INJ IV PUSH: CPT | Mod: XU

## 2019-05-19 PROCEDURE — 82435 ASSAY OF BLOOD CHLORIDE: CPT

## 2019-05-19 PROCEDURE — 99285 EMERGENCY DEPT VISIT HI MDM: CPT | Mod: 25

## 2019-05-19 PROCEDURE — 86850 RBC ANTIBODY SCREEN: CPT

## 2019-05-19 PROCEDURE — 86901 BLOOD TYPING SEROLOGIC RH(D): CPT

## 2019-05-19 PROCEDURE — 96375 TX/PRO/DX INJ NEW DRUG ADDON: CPT | Mod: XU

## 2019-05-19 PROCEDURE — 43753 TX GASTRO INTUB W/ASP: CPT

## 2019-05-19 PROCEDURE — 80053 COMPREHEN METABOLIC PANEL: CPT

## 2019-05-19 PROCEDURE — C1781: CPT

## 2019-05-19 PROCEDURE — 83605 ASSAY OF LACTIC ACID: CPT

## 2019-05-19 PROCEDURE — 82330 ASSAY OF CALCIUM: CPT

## 2019-05-19 PROCEDURE — 83735 ASSAY OF MAGNESIUM: CPT

## 2019-05-19 RX ADMIN — AMLODIPINE BESYLATE 10 MILLIGRAM(S): 2.5 TABLET ORAL at 06:14

## 2019-05-19 RX ADMIN — PANTOPRAZOLE SODIUM 40 MILLIGRAM(S): 20 TABLET, DELAYED RELEASE ORAL at 06:14

## 2019-05-19 RX ADMIN — Medication 150 MILLIGRAM(S): at 11:15

## 2019-05-19 RX ADMIN — Medication 5 MILLIGRAM(S): at 01:15

## 2019-05-19 RX ADMIN — NADOLOL 80 MILLIGRAM(S): 80 TABLET ORAL at 06:14

## 2019-05-19 RX ADMIN — HEPARIN SODIUM 5000 UNIT(S): 5000 INJECTION INTRAVENOUS; SUBCUTANEOUS at 06:15

## 2019-05-19 RX ADMIN — Medication 5 MILLIGRAM(S): at 11:15

## 2019-05-19 RX ADMIN — HEPARIN SODIUM 5000 UNIT(S): 5000 INJECTION INTRAVENOUS; SUBCUTANEOUS at 13:45

## 2019-05-19 RX ADMIN — Medication 75 MICROGRAM(S): at 06:15

## 2019-05-19 RX ADMIN — Medication 5 MILLIGRAM(S): at 06:14

## 2019-05-19 RX ADMIN — Medication 85 MILLIMOLE(S): at 11:30

## 2019-05-19 NOTE — DISCHARGE NOTE NURSING/CASE MANAGEMENT/SOCIAL WORK - NSDCPNDISPN_GEN_ALL_CORE
Side effects of pain management treatment/Education provided on the pain management plan of care/Safe use, storage and disposal of opioids when prescribed/Activities of daily living, including home environment that might     exacerbate pain or reduce effectiveness of the pain management plan of care as well as strategies to address these issues/Opioids not applicable/not prescribed

## 2019-05-19 NOTE — DISCHARGE NOTE NURSING/CASE MANAGEMENT/SOCIAL WORK - NSDCDPATPORTLINK_GEN_ALL_CORE
You can access the [email or web address], offered by [facility], by registering with the following website: [email or web address]

## 2019-05-19 NOTE — DISCHARGE NOTE PROVIDER - HOSPITAL COURSE
Patient was admitted to undergo incarcerated femoral hernia SBO  reduction and repair.  The patient tolerated the procedure well and was transferred to the floor in stable condition. Postoperatively, the patient's pain was well controlled with IV pain medications and later transitioned to PO pain meds, with adequate pain control. NGT was discontinued. At the time of discharge, the patient was hemodynamically stable, was tolerating PO diet, was voiding urine and passing stool, was ambulating, and was comfortable with adequate pain control. The patient was instructed to follow up with Dr. Prescott within 1-2 weeks after discharge from the hospital. The patient felt comfortable with discharge. The patient was discharged to home. The patient had no other issues.

## 2019-05-19 NOTE — PROGRESS NOTE ADULT - ASSESSMENT
83F s/p 5/17 incarcerated femoral hernia reduction and repair.     Plan:  - Pain control as needed  - Nil Per Os, continue IV Fluids. Plan to advance once GI fxn returns.   - Passed TOV.   - OOB and ambulating as tolerated  - home meds  - F/u AM labs.  - DVT ppx: subq heparin, sequential compression devices.     Acute Care Surgery #[id number]
83F s/p 5/17 incarcerated femoral hernia reduction and repair.     Plan:  - Pain control as needed  - NGT out, plan to advance when bowel function returns.   - Green out, TOV.   - OOB and ambulating as tolerated  - home meds  - F/u AM labs.  - DVT ppx: subq heparin, sequential compression devices.     Acute Care Surgery #[id number]

## 2019-05-19 NOTE — DISCHARGE NOTE PROVIDER - CARE PROVIDER_API CALL
Fuentes Prescott (MD)  Surgery; Surgical Critical Care  [address] [facility] [address], [address] [address]  Phone: [phone]  Fax: [phone]  Follow Up Time:

## 2019-05-19 NOTE — PROGRESS NOTE ADULT - SUBJECTIVE AND OBJECTIVE BOX
Surgery Progress Note     Subjective/24hour Events:   Patient seen and examined.   Yesterday to OR for repair of obstruction due to hernia.   Minimal pain today.   States she is hungry.   No flatus, no BM.     Vital Signs:  Vital Signs Last 24 Hrs  T(C): 37.2 (18 May 2019 09:13), Max: 37.7 (18 May 2019 05:45)  T(F): 99 (18 May 2019 09:13), Max: 99.8 (18 May 2019 05:45)  HR: 63 (18 May 2019 09:13) (60 - 82)  BP: 130/75 (18 May 2019 09:13) (100/57 - 154/82)  BP(mean): 74 (17 May 2019 21:45) (74 - 88)  RR: 16 (18 May 2019 09:13) (15 - 20)  SpO2: 99% (18 May 2019 09:13) (93% - 100%)    CAPILLARY BLOOD GLUCOSE          I&O's Detail    17 May 2019 07:01  -  18 May 2019 07:00  --------------------------------------------------------  IN:    lactated ringers.: 825 mL  Total IN: 825 mL    OUT:    Indwelling Catheter - Urethral: 920 mL    Nasoenteral Tube: 200 mL  Total OUT: 1120 mL    Total NET: -295 mL      18 May 2019 07:01  -  18 May 2019 11:12  --------------------------------------------------------  IN:  Total IN: 0 mL    OUT:    Indwelling Catheter - Urethral: 350 mL  Total OUT: 350 mL    Total NET: -350 mL          MEDICATIONS  (STANDING):  acetaminophen  IVPB .. 750 milliGRAM(s) IV Intermittent once  buDESOnide 160 MICROgram(s)/formoterol 4.5 MICROgram(s) Inhaler 2 Puff(s) Inhalation daily  fluticasone propionate 50 MICROgram(s)/spray Nasal Spray 2 Spray(s) Both Nostrils daily  heparin  Injectable 5000 Unit(s) SubCutaneous every 8 hours  lactated ringers. 1000 milliLiter(s) (100 mL/Hr) IV Continuous <Continuous>  levothyroxine Injectable 75 MICROGram(s) IV Push at bedtime  magnesium sulfate  IVPB 2 Gram(s) IV Intermittent once  metoprolol tartrate Injectable 5 milliGRAM(s) IV Push every 6 hours  montelukast 10 milliGRAM(s) Oral daily  potassium chloride  10 mEq/100 mL IVPB 10 milliEquivalent(s) IV Intermittent every 1 hour    MEDICATIONS  (PRN):  artificial  tears Solution 1 Drop(s) Both EYES two times a day PRN Dry Eyes  morphine  - Injectable 4 milliGRAM(s) IV Push every 4 hours PRN Severe Pain (7 - 10)  ondansetron Injectable 4 milliGRAM(s) IV Push every 6 hours PRN Nausea      Physical Exam:  General: NAD, resting comfortably in bed  Pulmonary: Nonlabored breathing, no respiratory distress  Abd: soft, ND, minimal ttp. Incision c/d/i.  Labs:    05-18    139  |  91<L>  |  50<H>  ----------------------------<  126<H>  3.5   |  33<H>  |  1.05    Ca    7.7<L>      18 May 2019 06:03  Phos  3.8     05-18  Mg     1.7     05-18    TPro  7.8  /  Alb  4.2  /  TBili  0.8  /  DBili  x   /  AST  14  /  ALT  10  /  AlkPhos  69  05-17    LIVER FUNCTIONS - ( 17 May 2019 12:11 )  Alb: 4.2 g/dL / Pro: 7.8 g/dL / ALK PHOS: 69 U/L / ALT: 10 U/L / AST: 14 U/L / GGT: x                                 11.4   6.94  )-----------( 212      ( 18 May 2019 09:46 )             35.4     PT/INR - ( 17 May 2019 12:11 )   PT: 11.6 sec;   INR: 1.01 ratio         PTT - ( 17 May 2019 12:11 )  PTT:22.1 sec    Imaging:
Surgery Progress Note     Subjective/24hour Events:   Patient seen and examined.   Yesterday NGT discontinued.   No acute events overnight.   Pain controlled.   Remains afebrile, hemodynamically stable.   No flatus, no BM.     Vital Signs:  Vital Signs Last 24 Hrs  T(C): 36.7 (19 May 2019 01:01), Max: 37.7 (18 May 2019 05:45)  T(F): 98 (19 May 2019 01:01), Max: 99.8 (18 May 2019 05:45)  HR: 66 (19 May 2019 01:01) (60 - 68)  BP: 107/68 (19 May 2019 01:01) (107/59 - 130/75)  BP(mean): --  RR: 18 (19 May 2019 01:01) (16 - 18)  SpO2: 98% (19 May 2019 01:01) (96% - 99%)    CAPILLARY BLOOD GLUCOSE          I&O's Detail    17 May 2019 07:01  -  18 May 2019 07:00  --------------------------------------------------------  IN:    lactated ringers.: 825 mL  Total IN: 825 mL    OUT:    Indwelling Catheter - Urethral: 920 mL    Nasoenteral Tube: 200 mL  Total OUT: 1120 mL    Total NET: -295 mL      18 May 2019 07:01  -  19 May 2019 05:30  --------------------------------------------------------  IN:    dextrose 5% + sodium chloride 0.45% with potassium chloride 20 mEq/L: 300 mL    IV PiggyBack: 350 mL  Total IN: 650 mL    OUT:    Indwelling Catheter - Urethral: 500 mL    Nasoenteral Tube: 500 mL    Voided: 1080 mL  Total OUT: 2080 mL    Total NET: -1430 mL          MEDICATIONS  (STANDING):  amLODIPine   Tablet 10 milliGRAM(s) Oral daily  atorvastatin 40 milliGRAM(s) Oral at bedtime  buDESOnide  80 MICROgram(s)/formoterol 4.5 MICROgram(s) Inhaler 2 Puff(s) Inhalation two times a day  buDESOnide 160 MICROgram(s)/formoterol 4.5 MICROgram(s) Inhaler 2 Puff(s) Inhalation daily  dextrose 5% + sodium chloride 0.45% with potassium chloride 20 mEq/L 1000 milliLiter(s) (75 mL/Hr) IV Continuous <Continuous>  fluticasone propionate 50 MICROgram(s)/spray Nasal Spray 2 Spray(s) Both Nostrils daily  heparin  Injectable 5000 Unit(s) SubCutaneous every 8 hours  levothyroxine Injectable 75 MICROGram(s) IV Push at bedtime  loratadine 10 milliGRAM(s) Oral daily  metoprolol tartrate Injectable 5 milliGRAM(s) IV Push every 6 hours  montelukast 10 milliGRAM(s) Oral daily  nadolol 80 milliGRAM(s) Oral two times a day  pantoprazole    Tablet 40 milliGRAM(s) Oral before breakfast  venlafaxine XR. 150 milliGRAM(s) Oral daily    MEDICATIONS  (PRN):  artificial  tears Solution 1 Drop(s) Both EYES two times a day PRN Dry Eyes  morphine  - Injectable 4 milliGRAM(s) IV Push every 4 hours PRN Severe Pain (7 - 10)  ondansetron Injectable 4 milliGRAM(s) IV Push every 6 hours PRN Nausea      Physical Exam:  General: NAD, resting comfortably in bed  Pulmonary: Nonlabored breathing, no respiratory distress  Abd: soft, ND, minimal ttp. Incision c/d/i.    Labs:    05-18    139  |  91<L>  |  50<H>  ----------------------------<  126<H>  3.5   |  33<H>  |  1.05    Ca    7.7<L>      18 May 2019 06:03  Phos  3.8     05-18  Mg     1.7     05-18    TPro  7.8  /  Alb  4.2  /  TBili  0.8  /  DBili  x   /  AST  14  /  ALT  10  /  AlkPhos  69  05-17    LIVER FUNCTIONS - ( 17 May 2019 12:11 )  Alb: 4.2 g/dL / Pro: 7.8 g/dL / ALK PHOS: 69 U/L / ALT: 10 U/L / AST: 14 U/L / GGT: x                                 11.4   6.94  )-----------( 212      ( 18 May 2019 09:46 )             35.4     PT/INR - ( 17 May 2019 12:11 )   PT: 11.6 sec;   INR: 1.01 ratio         PTT - ( 17 May 2019 12:11 )  PTT:22.1 sec

## 2019-05-19 NOTE — DISCHARGE NOTE PROVIDER - NSDCCPCAREPLAN_GEN_ALL_CORE_FT
PRINCIPAL DISCHARGE DIAGNOSIS  Diagnosis: Small bowel obstruction  Assessment and Plan of Treatment: WOUND CARE: You have a transparent/purple liquid dressing (called Dermabond) over your surgical incisions called. Do NOT remove the Dermabond. IN a few days, the Dermabond will fall off (or peel off) on its own.   BATHING: Please do not submerge wound underwater. You may shower and/or sponge bathe. Do not scrub incisions or apply lotions.  ACTIVITY: No heavy lifting or straining.   DIET: Return to your usual diet.  NOTIFY YOUR SURGEON IF: You have any bleeding that does not stop, any pus draining from your wound, any fever (over 100.4 F) or chills, persistent nausea/vomiting, persistent diarrhea, or if your pain is not controlled on your discharge pain medications.  PAIN CONTROL: Please take over the counter tylenol and motrin for pain.   FOLLOW-UP:  1. Follow-up with Dr. Prescott within 1-2 weeks of discharge.  Please call office for appointment  2. Please follow up with your primary care physician within 2 weeks regarding your hospitalization. Call his/her office to make an appointment.

## 2019-05-20 NOTE — DISCUSSION/SUMMARY
[Home] : patient was discharged to home [Med Rec Performed] : med rec performed [FreeTextEntry1] : Spoke with pt re: hospital discharge f/u appoint. Patient stated she will call back to schedule visit appoint.

## 2019-05-29 LAB — SURGICAL PATHOLOGY STUDY: SIGNIFICANT CHANGE UP

## 2019-05-31 PROBLEM — E03.9 HYPOTHYROIDISM, UNSPECIFIED: Chronic | Status: ACTIVE | Noted: 2019-05-17

## 2019-05-31 PROBLEM — J44.9 CHRONIC OBSTRUCTIVE PULMONARY DISEASE, UNSPECIFIED: Chronic | Status: ACTIVE | Noted: 2019-05-17

## 2019-06-04 ENCOUNTER — RX RENEWAL (OUTPATIENT)
Age: 84
End: 2019-06-04

## 2019-06-06 ENCOUNTER — APPOINTMENT (OUTPATIENT)
Dept: TRAUMA SURGERY | Facility: CLINIC | Age: 84
End: 2019-06-06
Payer: MEDICARE

## 2019-06-06 VITALS
TEMPERATURE: 98.9 F | WEIGHT: 127 LBS | HEART RATE: 72 BPM | SYSTOLIC BLOOD PRESSURE: 120 MMHG | DIASTOLIC BLOOD PRESSURE: 71 MMHG | HEIGHT: 61 IN | BODY MASS INDEX: 23.98 KG/M2

## 2019-06-06 DIAGNOSIS — Z98.890 OTHER SPECIFIED POSTPROCEDURAL STATES: ICD-10-CM

## 2019-06-06 DIAGNOSIS — K41.30 UNILATERAL FEMORAL HERNIA, WITH OBSTRUCTION, W/OUT GANGRENE, NOT SPECIFIED AS RECURRENT: ICD-10-CM

## 2019-06-06 PROCEDURE — 99024 POSTOP FOLLOW-UP VISIT: CPT

## 2019-06-06 NOTE — HISTORY OF PRESENT ILLNESS
[de-identified] :  was recently admitted for an incarcerated left femoral hernia with SBO, JOVITA secondary to hypovolemia from vomiting. She was taken to the OR and underwent repair of L femoral hernia with mesh plug. She is doing well, eating normally, having normal bowel function, no pain. She is still feeling a little tired, is sleeping more than usual. Not taking pain medications.\par \par Abd soft, nontender, nondistended. Well-healing left groin incision without erythema, drainage, open areas, fluctuance, or induration.\par \par  is doing well. I counseled her that having decreased energy is normal after surgery, and that she should engage in light physical activity daily (no lifting). We discussed possibility of hernia recurrence. Return to office as needed.

## 2019-06-08 ENCOUNTER — EMERGENCY (EMERGENCY)
Facility: HOSPITAL | Age: 84
LOS: 1 days | Discharge: HOME | End: 2019-06-08
Attending: EMERGENCY MEDICINE
Payer: MEDICARE

## 2019-06-08 ENCOUNTER — TRANSCRIPTION ENCOUNTER (OUTPATIENT)
Age: 84
End: 2019-06-08

## 2019-06-08 VITALS
OXYGEN SATURATION: 97 % | HEART RATE: 64 BPM | HEIGHT: 61 IN | RESPIRATION RATE: 18 BRPM | SYSTOLIC BLOOD PRESSURE: 143 MMHG | TEMPERATURE: 98 F | WEIGHT: 130.07 LBS | DIASTOLIC BLOOD PRESSURE: 63 MMHG

## 2019-06-08 VITALS
TEMPERATURE: 99 F | SYSTOLIC BLOOD PRESSURE: 129 MMHG | DIASTOLIC BLOOD PRESSURE: 67 MMHG | OXYGEN SATURATION: 95 % | HEART RATE: 69 BPM | RESPIRATION RATE: 16 BRPM

## 2019-06-08 DIAGNOSIS — Z90.49 ACQUIRED ABSENCE OF OTHER SPECIFIED PARTS OF DIGESTIVE TRACT: Chronic | ICD-10-CM

## 2019-06-08 PROCEDURE — 70450 CT HEAD/BRAIN W/O DYE: CPT

## 2019-06-08 PROCEDURE — 70450 CT HEAD/BRAIN W/O DYE: CPT | Mod: 26

## 2019-06-08 PROCEDURE — 99284 EMERGENCY DEPT VISIT MOD MDM: CPT | Mod: GC

## 2019-06-08 PROCEDURE — 99284 EMERGENCY DEPT VISIT MOD MDM: CPT | Mod: 25

## 2019-06-08 NOTE — ED PROVIDER NOTE - OBJECTIVE STATEMENT
84 yo F, w/ PMH of COPD, s/p open appendectomy as a child, recent admission to hospital for SBO s/p surgical repair, BIBEMS from urgent care d/t  presents with 4 days of diffuse abdominal pain with nausea. Saw a gastroenterologist prior to admission and was recommended to go to the ED. Does not remember last flatus; last BM was one week ago. No fevers/chills.   Was last seen in the ED in January for nausea and abdominal pain, and AMA'd. Has never noticed bulging in the groins before.   NGT had 2.3 liters of initial output. 82 yo F, w/ PMH of COPD, s/p open appendectomy as a child, recent admission to hospital for SBO s/p surgical repair, BIBEMS from urgent care d/t fall from bed at home. Patient accidentally rolled out of bed this morning and hit her face/head on dresser on way down to floor. No LOC, immediately ambulatory. No nausea, vomiting. No headache, neck stiffness, neck pain, or extremity pain. Patient suffered hematoma to forehead, as well as abrasion to cheek and nose. Initially went to urgent care, where received tetanus vaccine update, and was transferred to [facility] ED for CT head and further evaluation.

## 2019-06-08 NOTE — ED ADULT NURSE NOTE - NSIMPLEMENTINTERV_GEN_ALL_ED
Implemented All Fall Risk Interventions:  [place] to call system. Call bell, personal items and telephone within reach. Instruct patient to call for assistance. Room bathroom lighting operational. Non-slip footwear when patient is off stretcher. Physically safe environment: no spills, clutter or unnecessary equipment. Stretcher in lowest position, wheels locked, appropriate side rails in place. Provide visual cue, wrist band, yellow gown, etc. Monitor gait and stability. Monitor for mental status changes and reorient to person, place, and time. Review medications for side effects contributing to fall risk. Reinforce activity limits and safety measures with patient and family.

## 2019-06-08 NOTE — ED PROVIDER NOTE - SKIN, MLM
Skin normal color for race, warm, dry and intact. +R frontal hematoma. +Abrasion over bridge of nose.

## 2019-06-08 NOTE — ED PROVIDER NOTE - GASTROINTESTINAL NEGATIVE STATEMENT, MLM
Pt is a 23 year old F with no PMH that presents with one day hx of shoulder and subscapular pain. She states that the day before yesterday she was in her usual state of health, then yesterday morning she started developing pain below her scapula. She states that the pain is constant, but if she changes position or lays on her back the pain become worse and sharp. She thought it was a "stiff shoulder," so she tried some Motrin and rest, however, the pain was worse today. The pain is worse with palpation and with position.
no abdominal pain, no bloating, no constipation, no diarrhea, no nausea and no vomiting.

## 2019-06-08 NOTE — ED PROVIDER NOTE - MUSCULOSKELETAL, MLM
Spine appears normal, range of motion is not limited, no muscle or joint tenderness. No midline spine pain.

## 2019-06-08 NOTE — ED PROVIDER NOTE - NSFOLLOWUPINSTRUCTIONS_ED_ALL_ED_FT
You were seen in the ED for a head injury, the CAT scan showed no internal injuries.     You may use Tylenol 650mg every 8 hours as needed for pain.     Return to the ED for worsening headaches, repetitive vomiting or any other concerns.    See your regular doctor in 2-3 days.

## 2019-06-08 NOTE — ED PROVIDER NOTE - NEUROLOGICAL, MLM
Alert and oriented, no focal deficits, no motor or sensory deficits. 5/5 strength all extremities, cranial nerves intact, negative pronator drift.

## 2019-06-08 NOTE — ED PROVIDER NOTE - CLINICAL SUMMARY MEDICAL DECISION MAKING FREE TEXT BOX
82 yo F, not on anticoagulation, presenting from urgent care d/t fall from bed at home. No LOC, no neck pain or extremity pain. No headache, no neurologic deficit. Ambulatory immediately after fall. Non-focal neurologic exam. +R frontal hematoma w/ nose abrasion. Will obtain CT head non-con, treat pain as needed, and reassess. 82 yo F, not on anticoagulation, presenting from urgent care d/t fall from bed at home. No LOC, no neck pain or extremity pain. No headache, no neurologic deficit. Ambulatory immediately after fall. Non-focal neurologic exam. +R frontal hematoma w/ nose abrasion. Will obtain CT head non-con, treat pain as needed, and reassess.  vineet coffey mechanical fall with head trauma no loc no evidence of orbital entrapment - scalp hematoma gcs 15 neck cleared clinically - no other complaints pain minimal no anticoag, ct head if neg dc tdap given at urgent care

## 2019-06-08 NOTE — ED ADULT NURSE NOTE - OBJECTIVE STATEMENT
3 yo F, w/ PMH of COPD, BIBEMS from urgent care d/t fall from bed at home. Patient reported that she accidentally rolled out of bed this morning and hit her face/head on dresser on way down to floor. No LOC, immediately ambulatory. No nausea, vomiting. No headache, neck stiffness, neck pain, or extremity pain. pt reports pain level of 4/10 on pain scale and aching in quality.  Patient suffered hematoma to forehead, as well as abrasion to cheek and nose. Initially went to urgent care, where received tetanus vaccine update, and was transferred to [facility] ED for CT head and further evaluation.

## 2019-06-11 ENCOUNTER — APPOINTMENT (OUTPATIENT)
Dept: CARDIOLOGY | Facility: CLINIC | Age: 84
End: 2019-06-11
Payer: MEDICARE

## 2019-06-11 PROCEDURE — 93306 TTE W/DOPPLER COMPLETE: CPT

## 2019-07-08 ENCOUNTER — LABORATORY RESULT (OUTPATIENT)
Age: 84
End: 2019-07-08

## 2019-07-09 ENCOUNTER — APPOINTMENT (OUTPATIENT)
Dept: CARDIOLOGY | Facility: CLINIC | Age: 84
End: 2019-07-09
Payer: MEDICARE

## 2019-07-09 ENCOUNTER — NON-APPOINTMENT (OUTPATIENT)
Age: 84
End: 2019-07-09

## 2019-07-09 VITALS
HEART RATE: 69 BPM | WEIGHT: 130 LBS | OXYGEN SATURATION: 92 % | SYSTOLIC BLOOD PRESSURE: 144 MMHG | BODY MASS INDEX: 24.56 KG/M2 | DIASTOLIC BLOOD PRESSURE: 62 MMHG

## 2019-07-09 VITALS — DIASTOLIC BLOOD PRESSURE: 58 MMHG | SYSTOLIC BLOOD PRESSURE: 112 MMHG

## 2019-07-09 PROCEDURE — 93040 RHYTHM ECG WITH REPORT: CPT | Mod: 59

## 2019-07-09 PROCEDURE — 99214 OFFICE O/P EST MOD 30 MIN: CPT

## 2019-07-09 PROCEDURE — 36415 COLL VENOUS BLD VENIPUNCTURE: CPT

## 2019-07-09 PROCEDURE — 93000 ELECTROCARDIOGRAM COMPLETE: CPT

## 2019-07-09 NOTE — HISTORY OF PRESENT ILLNESS
[FreeTextEntry1] : She denies chest pain, shortness of breath, and palpitations.\par She is on a higher dose of levothyroxine.

## 2019-07-09 NOTE — REASON FOR VISIT
[Follow-Up - Clinic] : a clinic follow-up of [Hyperlipidemia] : hyperlipidemia [Hypertension] : hypertension [FreeTextEntry1] : hypothyroidism

## 2019-07-12 ENCOUNTER — MEDICATION RENEWAL (OUTPATIENT)
Age: 84
End: 2019-07-12

## 2019-07-15 ENCOUNTER — MEDICATION RENEWAL (OUTPATIENT)
Age: 84
End: 2019-07-15

## 2019-07-21 LAB
ALBUMIN SERPL ELPH-MCNC: 4.3 G/DL
ALP BLD-CCNC: 101 U/L
ALT SERPL-CCNC: 10 U/L
ANION GAP SERPL CALC-SCNC: 10 MMOL/L
AST SERPL-CCNC: 12 U/L
BILIRUB SERPL-MCNC: 0.4 MG/DL
BUN SERPL-MCNC: 23 MG/DL
CALCIUM SERPL-MCNC: 9.1 MG/DL
CHLORIDE SERPL-SCNC: 103 MMOL/L
CHOLEST SERPL-MCNC: 117 MG/DL
CHOLEST/HDLC SERPL: 2.6 RATIO
CO2 SERPL-SCNC: 29 MMOL/L
CREAT SERPL-MCNC: 0.8 MG/DL
GLUCOSE SERPL-MCNC: 105 MG/DL
HDLC SERPL-MCNC: 45 MG/DL
LDLC SERPL CALC-MCNC: 45 MG/DL
LDLC SERPL DIRECT ASSAY-MCNC: 57 MG/DL
POTASSIUM SERPL-SCNC: 5.2 MMOL/L
PROT SERPL-MCNC: 7.1 G/DL
SODIUM SERPL-SCNC: 142 MMOL/L
T3 SERPL-MCNC: 85 NG/DL
T3RU NFR SERPL: 0.9 TBI
T4 FREE SERPL-MCNC: 1.6 NG/DL
T4 SERPL-MCNC: 8 UG/DL
TRIGL SERPL-MCNC: 134 MG/DL
TSH SERPL-ACNC: 0.52 UIU/ML

## 2019-08-01 ENCOUNTER — APPOINTMENT (OUTPATIENT)
Dept: GASTROENTEROLOGY | Facility: CLINIC | Age: 84
End: 2019-08-01
Payer: MEDICARE

## 2019-08-01 VITALS
HEIGHT: 61 IN | DIASTOLIC BLOOD PRESSURE: 78 MMHG | SYSTOLIC BLOOD PRESSURE: 114 MMHG | TEMPERATURE: 97.3 F | OXYGEN SATURATION: 94 %

## 2019-08-01 DIAGNOSIS — R19.7 DIARRHEA, UNSPECIFIED: ICD-10-CM

## 2019-08-01 PROCEDURE — 99214 OFFICE O/P EST MOD 30 MIN: CPT

## 2019-08-01 RX ORDER — SODIUM PICOSULFATE, MAGNESIUM OXIDE, AND ANHYDROUS CITRIC ACID 10; 3.5; 12 MG/16.2G; G/16.2G; G/16.2G
10-3.5-12 POWDER, METERED ORAL
Qty: 1 | Refills: 0 | Status: DISCONTINUED | COMMUNITY
Start: 2018-05-14 | End: 2019-08-01

## 2019-08-01 RX ORDER — NAPROXEN 500 MG/1
500 TABLET ORAL
Qty: 60 | Refills: 0 | Status: DISCONTINUED | COMMUNITY
Start: 2018-10-15 | End: 2019-08-01

## 2019-08-01 RX ORDER — AMITRIPTYLINE HYDROCHLORIDE 10 MG/1
10 TABLET, FILM COATED ORAL
Qty: 180 | Refills: 3 | Status: DISCONTINUED | COMMUNITY
Start: 2017-11-16 | End: 2019-08-01

## 2019-08-01 RX ORDER — ONDANSETRON 4 MG/1
4 TABLET, ORALLY DISINTEGRATING ORAL EVERY 8 HOURS
Qty: 18 | Refills: 1 | Status: DISCONTINUED | COMMUNITY
Start: 2019-01-13 | End: 2019-08-01

## 2019-08-01 RX ORDER — NAPROXEN 500 MG/1
500 TABLET ORAL
Qty: 30 | Refills: 0 | Status: DISCONTINUED | COMMUNITY
Start: 2018-11-26 | End: 2019-08-01

## 2019-08-01 NOTE — HISTORY OF PRESENT ILLNESS
[FreeTextEntry1] : \par 10 weeks ago had incarcerated left femoral hernia and SBO - emergency surgery\par \par Last December was here with problem she had been having for a few months (now for a year)\par Was having uncontrollable BM after dinner (either out or at home) at least once a week\par since saw me - stopped milk, and garlic as well, tomatoes - no change\par Jan - Mar was in [place], still happened\par \par Typically does not have daily BMs; then has diarrhea\par rarely has a formed BM\par \par After the surgery in May, had formed stool!\par But diarrhea returned\par \par No weight loss\par \par 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

## 2019-08-01 NOTE — PHYSICAL EXAM
[General Appearance - Alert] : alert [General Appearance - In No Acute Distress] : in no acute distress [Neck Appearance] : the appearance of the neck was normal [Neck Cervical Mass (___cm)] : no neck mass was observed [Jugular Venous Distention Increased] : there was no jugular-venous distention [Thyroid Diffuse Enlargement] : the thyroid was not enlarged [Thyroid Nodule] : there were no palpable thyroid nodules [Auscultation Breath Sounds / Voice Sounds] : lungs were clear to auscultation bilaterally [Bowel Sounds] : normal bowel sounds [Abdomen Soft] : soft [Abdomen Tenderness] : non-tender [] : no hepato-splenomegaly [Abdomen Mass (___ Cm)] : no abdominal mass palpated

## 2019-08-01 NOTE — ASSESSMENT
[FreeTextEntry1] : \par Diarrhea once or twice a week, and has no control \par \par colonoscopy with no cause found\par no new medication\par check stool studies\par start Citrucel daily

## 2019-08-07 NOTE — ASSESSMENT
[FreeTextEntry1] : It is unclear what the etiology is that the patient's symptoms, a myelopathy is a possibility. The patient was referred to the emergency room at [facility] for emergent evaluation, but she refuses. She is warned.\par \par Stat x-rays of the chest and thoracic and lumbar sacral spine will be obtained. Further evaluation by physiatry, neurology and/or neurosurgery will be considered.
stated

## 2019-08-31 ENCOUNTER — RX RENEWAL (OUTPATIENT)
Age: 84
End: 2019-08-31

## 2019-09-04 ENCOUNTER — RX RENEWAL (OUTPATIENT)
Age: 84
End: 2019-09-04

## 2019-09-09 ENCOUNTER — TRANSCRIPTION ENCOUNTER (OUTPATIENT)
Age: 84
End: 2019-09-09

## 2019-10-03 ENCOUNTER — NON-APPOINTMENT (OUTPATIENT)
Age: 84
End: 2019-10-03

## 2019-10-03 ENCOUNTER — LABORATORY RESULT (OUTPATIENT)
Age: 84
End: 2019-10-03

## 2019-10-03 ENCOUNTER — APPOINTMENT (OUTPATIENT)
Dept: CARDIOLOGY | Facility: CLINIC | Age: 84
End: 2019-10-03
Payer: MEDICARE

## 2019-10-03 VITALS
SYSTOLIC BLOOD PRESSURE: 112 MMHG | BODY MASS INDEX: 24 KG/M2 | WEIGHT: 127 LBS | OXYGEN SATURATION: 95 % | DIASTOLIC BLOOD PRESSURE: 72 MMHG | HEART RATE: 57 BPM

## 2019-10-03 DIAGNOSIS — R53.83 OTHER FATIGUE: ICD-10-CM

## 2019-10-03 PROCEDURE — 93000 ELECTROCARDIOGRAM COMPLETE: CPT

## 2019-10-03 PROCEDURE — 36415 COLL VENOUS BLD VENIPUNCTURE: CPT

## 2019-10-03 PROCEDURE — 93040 RHYTHM ECG WITH REPORT: CPT | Mod: 59

## 2019-10-03 PROCEDURE — 99214 OFFICE O/P EST MOD 30 MIN: CPT

## 2019-10-05 NOTE — HISTORY OF PRESENT ILLNESS
[FreeTextEntry1] : Went to [facility] [place]itis.\par Fatigued.

## 2019-10-05 NOTE — REASON FOR VISIT
[Follow-Up - Clinic] : a clinic follow-up of [Fatigue] : feeling tired (fatigue) [Hypertension] : hypertension

## 2019-10-13 LAB
ALBUMIN SERPL ELPH-MCNC: 4.6 G/DL
ALP BLD-CCNC: 79 U/L
ALT SERPL-CCNC: 12 U/L
ANION GAP SERPL CALC-SCNC: 13 MMOL/L
AST SERPL-CCNC: 16 U/L
BASOPHILS # BLD AUTO: 0.07 K/UL
BASOPHILS NFR BLD AUTO: 0.7 %
BILIRUB SERPL-MCNC: 0.5 MG/DL
BUN SERPL-MCNC: 24 MG/DL
CALCIUM SERPL-MCNC: 9.6 MG/DL
CHLORIDE SERPL-SCNC: 100 MMOL/L
CHOLEST SERPL-MCNC: 135 MG/DL
CHOLEST/HDLC SERPL: 2.9 RATIO
CO2 SERPL-SCNC: 30 MMOL/L
CREAT SERPL-MCNC: 0.92 MG/DL
EOSINOPHIL # BLD AUTO: 0.2 K/UL
EOSINOPHIL NFR BLD AUTO: 2.1 %
ERYTHROCYTE [SEDIMENTATION RATE] IN BLOOD BY WESTERGREN METHOD: 42 MM/HR
GLUCOSE SERPL-MCNC: 121 MG/DL
HCT VFR BLD CALC: 40.7 %
HDLC SERPL-MCNC: 46 MG/DL
HGB BLD-MCNC: 12.4 G/DL
IMM GRANULOCYTES NFR BLD AUTO: 0.4 %
LDLC SERPL CALC-MCNC: 53 MG/DL
LDLC SERPL DIRECT ASSAY-MCNC: 68 MG/DL
LYMPHOCYTES # BLD AUTO: 3.84 K/UL
LYMPHOCYTES NFR BLD AUTO: 39.7 %
MAN DIFF?: NORMAL
MCHC RBC-ENTMCNC: 30.5 GM/DL
MCHC RBC-ENTMCNC: 31 PG
MCV RBC AUTO: 101.8 FL
MONOCYTES # BLD AUTO: 1.07 K/UL
MONOCYTES NFR BLD AUTO: 11.1 %
NEUTROPHILS # BLD AUTO: 4.46 K/UL
NEUTROPHILS NFR BLD AUTO: 46 %
PLATELET # BLD AUTO: 262 K/UL
POTASSIUM SERPL-SCNC: 4.6 MMOL/L
PROT SERPL-MCNC: 7.5 G/DL
RBC # BLD: 4 M/UL
RBC # FLD: 13.1 %
SODIUM SERPL-SCNC: 143 MMOL/L
T3 SERPL-MCNC: 79 NG/DL
T3RU NFR SERPL: 0.9 TBI
T4 FREE SERPL-MCNC: 1.7 NG/DL
T4 SERPL-MCNC: 8.7 UG/DL
TRIGL SERPL-MCNC: 181 MG/DL
TSH SERPL-ACNC: 1.28 UIU/ML
WBC # FLD AUTO: 9.68 K/UL

## 2019-10-22 ENCOUNTER — OTHER (OUTPATIENT)
Age: 84
End: 2019-10-22

## 2019-11-26 ENCOUNTER — RX RENEWAL (OUTPATIENT)
Age: 84
End: 2019-11-26

## 2019-12-11 ENCOUNTER — APPOINTMENT (OUTPATIENT)
Dept: MRI IMAGING | Facility: CLINIC | Age: 84
End: 2019-12-11
Payer: MEDICARE

## 2019-12-11 ENCOUNTER — OUTPATIENT (OUTPATIENT)
Dept: OUTPATIENT SERVICES | Facility: HOSPITAL | Age: 84
LOS: 1 days | End: 2019-12-11
Payer: MEDICARE

## 2019-12-11 DIAGNOSIS — Z00.8 ENCOUNTER FOR OTHER GENERAL EXAMINATION: ICD-10-CM

## 2019-12-11 DIAGNOSIS — Z90.49 ACQUIRED ABSENCE OF OTHER SPECIFIED PARTS OF DIGESTIVE TRACT: Chronic | ICD-10-CM

## 2019-12-11 PROCEDURE — 72148 MRI LUMBAR SPINE W/O DYE: CPT | Mod: 26

## 2019-12-11 PROCEDURE — 72148 MRI LUMBAR SPINE W/O DYE: CPT

## 2020-01-07 ENCOUNTER — APPOINTMENT (OUTPATIENT)
Dept: CARDIOLOGY | Facility: CLINIC | Age: 85
End: 2020-01-07
Payer: MEDICARE

## 2020-01-07 ENCOUNTER — NON-APPOINTMENT (OUTPATIENT)
Age: 85
End: 2020-01-07

## 2020-01-07 VITALS — DIASTOLIC BLOOD PRESSURE: 62 MMHG | SYSTOLIC BLOOD PRESSURE: 134 MMHG

## 2020-01-07 VITALS
OXYGEN SATURATION: 97 % | BODY MASS INDEX: 25.51 KG/M2 | HEART RATE: 60 BPM | SYSTOLIC BLOOD PRESSURE: 110 MMHG | WEIGHT: 135 LBS | DIASTOLIC BLOOD PRESSURE: 50 MMHG

## 2020-01-07 DIAGNOSIS — G47.9 SLEEP DISORDER, UNSPECIFIED: ICD-10-CM

## 2020-01-07 PROCEDURE — 93000 ELECTROCARDIOGRAM COMPLETE: CPT

## 2020-01-07 PROCEDURE — 99214 OFFICE O/P EST MOD 30 MIN: CPT

## 2020-01-07 PROCEDURE — 93306 TTE W/DOPPLER COMPLETE: CPT

## 2020-01-07 PROCEDURE — 93040 RHYTHM ECG WITH REPORT: CPT | Mod: 59

## 2020-01-07 NOTE — HISTORY OF PRESENT ILLNESS
[FreeTextEntry1] : She denies chest pain, shortness of breath, and palpitations.\par Difficulty sleeping.  Benadryl ineffective, and cannot tolerate Ambien.\par She is looking forward to 6 weeks in [place].

## 2020-03-02 ENCOUNTER — RX RENEWAL (OUTPATIENT)
Age: 85
End: 2020-03-02

## 2020-03-04 NOTE — ED ADULT TRIAGE NOTE - SPO2 (%)
Reason for Readmission:     L lobe pneumonia RUR Score/Risk Level:     19 moderate risk PCP: First and Last name:  Colten Louis MD 
 Name of Practice:  
 Are you a current patient: Yes/No: yes Approximate date of last visit:  
 
Is a Care Conference indicated: unit IDR rounds Did you attend your follow up appointment (s): If not, why not: discharged from [facility] last week Resources/supports as identified by patient/family:   Family support Top Challenges facing patient (as identified by patient/family and CM): Finances/Medication cost?     No challenges reported Transportation      Pt's  able to transport by car Support system or lack thereof?      family Living arrangements? Lives at daughter's house with her , 1 story house, 2 steps to enter Self-care/ADLs/Cognition? Currently needs supervision assist for all ADLs, daughter reports intermittent confusion Current Advanced Directive/Advance Care Plan: On file 3/2011 Plan for utilizing home health:   Currently using home health Transition of Care Plan:    Based on readmission, the patient's previous Plan of Care discharge to [facility], [facility] transport 
 has been evaluated and/or modified. The current Transition of Care Plan is: 1. Patient in ED bed waiting for inpatient admission 2. Patient will need 2nd IM letter at discharge 3. Patient's daughter prefers for patient to discharge home with resumption of HH and follow up appointments. Patient is a 76year old female admitted 3/4 for L lobe pneumonia. Patient asleep in bed, CM met with patient's daughter Anjelica Fallon outside room. Demographic information verified and correct. Insurance information verified and correct. Patient lives in an in-law suite at her daughter's house with her , no home oxygen.   Patient uses a walker for ambulation and owns a shower seat and BSC. Patient is currently using home health, daughter cannot remember company. Patient uses [facility] [address] in [place]. Patient needs supervision assist for all ADLs and does not drive. Patient's  is able to transport by car. Care Management Interventions PCP Verified by CM: Jeffrey Harris MD) Mode of Transport at Discharge: Other (see comment)(pt's  can transport by car) Transition of Care Consult (CM Consult): Discharge Planning Discharge Durable Medical Equipment: No 
Physical Therapy Consult: Yes Occupational Therapy Consult: No 
Speech Therapy Consult: Yes Current Support Network: Relative's Home(lives at [facility] with , 1 story house, 2 steps to enter) Confirm Follow Up Transport: Family Discharge Location Discharge Placement: Home Mani Estevez RN, BSN [employer] [phone]

## 2020-05-25 ENCOUNTER — RX RENEWAL (OUTPATIENT)
Age: 85
End: 2020-05-25

## 2020-07-27 ENCOUNTER — RX RENEWAL (OUTPATIENT)
Age: 85
End: 2020-07-27

## 2020-09-10 ENCOUNTER — LABORATORY RESULT (OUTPATIENT)
Age: 85
End: 2020-09-10

## 2020-09-10 ENCOUNTER — APPOINTMENT (OUTPATIENT)
Dept: CARDIOLOGY | Facility: CLINIC | Age: 85
End: 2020-09-10
Payer: MEDICARE

## 2020-09-10 ENCOUNTER — NON-APPOINTMENT (OUTPATIENT)
Age: 85
End: 2020-09-10

## 2020-09-10 VITALS — HEART RATE: 61 BPM | OXYGEN SATURATION: 97 %

## 2020-09-10 VITALS — SYSTOLIC BLOOD PRESSURE: 124 MMHG | DIASTOLIC BLOOD PRESSURE: 74 MMHG

## 2020-09-10 PROCEDURE — 93040 RHYTHM ECG WITH REPORT: CPT | Mod: 59

## 2020-09-10 PROCEDURE — 99214 OFFICE O/P EST MOD 30 MIN: CPT

## 2020-09-10 PROCEDURE — 36415 COLL VENOUS BLD VENIPUNCTURE: CPT

## 2020-09-10 PROCEDURE — 93306 TTE W/DOPPLER COMPLETE: CPT

## 2020-09-10 PROCEDURE — 93000 ELECTROCARDIOGRAM COMPLETE: CPT

## 2020-09-10 NOTE — HISTORY OF PRESENT ILLNESS
[FreeTextEntry1] : She experiences shortness of breath only when she climbs 1 flight of steps rapidly.  This has been stable for many years.\par She denies chest pain and palpitations.

## 2020-09-12 LAB
ALBUMIN SERPL ELPH-MCNC: 4 G/DL
ALP BLD-CCNC: 82 U/L
ALT SERPL-CCNC: 12 U/L
ANION GAP SERPL CALC-SCNC: 11 MMOL/L
AST SERPL-CCNC: 17 U/L
BASOPHILS # BLD AUTO: 0.07 K/UL
BASOPHILS NFR BLD AUTO: 0.9 %
BILIRUB SERPL-MCNC: 0.5 MG/DL
BUN SERPL-MCNC: 24 MG/DL
CALCIUM SERPL-MCNC: 8.7 MG/DL
CHLORIDE SERPL-SCNC: 105 MMOL/L
CHOLEST SERPL-MCNC: 135 MG/DL
CHOLEST/HDLC SERPL: 3.1 RATIO
CO2 SERPL-SCNC: 27 MMOL/L
CREAT SERPL-MCNC: 0.7 MG/DL
EOSINOPHIL # BLD AUTO: 0.17 K/UL
EOSINOPHIL NFR BLD AUTO: 2.2 %
GLUCOSE SERPL-MCNC: 71 MG/DL
HCT VFR BLD CALC: 35.3 %
HDLC SERPL-MCNC: 43 MG/DL
HGB BLD-MCNC: 11 G/DL
IMM GRANULOCYTES NFR BLD AUTO: 0.5 %
LDLC SERPL CALC-MCNC: 59 MG/DL
LDLC SERPL DIRECT ASSAY-MCNC: 64 MG/DL
LYMPHOCYTES # BLD AUTO: 2.65 K/UL
LYMPHOCYTES NFR BLD AUTO: 33.8 %
MAN DIFF?: NORMAL
MCHC RBC-ENTMCNC: 31.2 GM/DL
MCHC RBC-ENTMCNC: 32.2 PG
MCV RBC AUTO: 103.2 FL
MONOCYTES # BLD AUTO: 1.04 K/UL
MONOCYTES NFR BLD AUTO: 13.3 %
NEUTROPHILS # BLD AUTO: 3.87 K/UL
NEUTROPHILS NFR BLD AUTO: 49.3 %
PLATELET # BLD AUTO: 212 K/UL
POTASSIUM SERPL-SCNC: 4.3 MMOL/L
PROT SERPL-MCNC: 6.6 G/DL
RBC # BLD: 3.42 M/UL
RBC # FLD: 13.1 %
SODIUM SERPL-SCNC: 143 MMOL/L
T3 SERPL-MCNC: 72 NG/DL
T3RU NFR SERPL: 0.9 TBI
T4 FREE SERPL-MCNC: 1.7 NG/DL
T4 SERPL-MCNC: 8.4 UG/DL
TRIGL SERPL-MCNC: 164 MG/DL
TSH SERPL-ACNC: 0.11 UIU/ML
WBC # FLD AUTO: 7.84 K/UL

## 2020-09-13 ENCOUNTER — NON-APPOINTMENT (OUTPATIENT)
Age: 85
End: 2020-09-13

## 2020-09-18 RX ORDER — LEVOTHYROXINE SODIUM 0.17 MG/1
175 TABLET ORAL
Qty: 90 | Refills: 2 | Status: DISCONTINUED | COMMUNITY
Start: 2018-12-17 | End: 2020-09-18

## 2020-10-05 ENCOUNTER — RX RENEWAL (OUTPATIENT)
Age: 85
End: 2020-10-05

## 2020-12-10 ENCOUNTER — APPOINTMENT (OUTPATIENT)
Dept: CARDIOLOGY | Facility: CLINIC | Age: 85
End: 2020-12-10

## 2020-12-10 ENCOUNTER — RX RENEWAL (OUTPATIENT)
Age: 85
End: 2020-12-10

## 2020-12-16 PROBLEM — Z87.440 HISTORY OF URINARY TRACT INFECTION: Status: RESOLVED | Noted: 2018-05-14 | Resolved: 2020-12-16

## 2021-03-06 ENCOUNTER — RX RENEWAL (OUTPATIENT)
Age: 86
End: 2021-03-06

## 2021-03-21 ENCOUNTER — TRANSCRIPTION ENCOUNTER (OUTPATIENT)
Age: 86
End: 2021-03-21

## 2021-03-29 ENCOUNTER — TRANSCRIPTION ENCOUNTER (OUTPATIENT)
Age: 86
End: 2021-03-29

## 2021-05-29 ENCOUNTER — RX RENEWAL (OUTPATIENT)
Age: 86
End: 2021-05-29

## 2021-06-15 DIAGNOSIS — R92.2 INCONCLUSIVE MAMMOGRAM: ICD-10-CM

## 2021-06-15 DIAGNOSIS — Z12.39 ENCOUNTER FOR OTHER SCREENING FOR MALIGNANT NEOPLASM OF BREAST: ICD-10-CM

## 2021-07-02 ENCOUNTER — RX RENEWAL (OUTPATIENT)
Age: 86
End: 2021-07-02

## 2021-07-20 ENCOUNTER — RESULT REVIEW (OUTPATIENT)
Age: 86
End: 2021-07-20

## 2021-07-20 ENCOUNTER — APPOINTMENT (OUTPATIENT)
Dept: ULTRASOUND IMAGING | Facility: CLINIC | Age: 86
End: 2021-07-20
Payer: MEDICARE

## 2021-07-20 ENCOUNTER — APPOINTMENT (OUTPATIENT)
Dept: MAMMOGRAPHY | Facility: CLINIC | Age: 86
End: 2021-07-20
Payer: MEDICARE

## 2021-07-20 PROCEDURE — 76641 ULTRASOUND BREAST COMPLETE: CPT | Mod: 50

## 2021-07-20 PROCEDURE — 77067 SCR MAMMO BI INCL CAD: CPT

## 2021-07-20 PROCEDURE — 77063 BREAST TOMOSYNTHESIS BI: CPT

## 2021-08-15 ENCOUNTER — RX RENEWAL (OUTPATIENT)
Age: 86
End: 2021-08-15

## 2021-08-16 ENCOUNTER — RX RENEWAL (OUTPATIENT)
Age: 86
End: 2021-08-16

## 2021-08-22 ENCOUNTER — RX RENEWAL (OUTPATIENT)
Age: 86
End: 2021-08-22

## 2021-08-25 ENCOUNTER — RX RENEWAL (OUTPATIENT)
Age: 86
End: 2021-08-25

## 2021-09-27 ENCOUNTER — APPOINTMENT (OUTPATIENT)
Dept: CARDIOLOGY | Facility: CLINIC | Age: 86
End: 2021-09-27

## 2021-10-15 ENCOUNTER — RX RENEWAL (OUTPATIENT)
Age: 86
End: 2021-10-15

## 2021-10-15 RX ORDER — FLUTICASONE PROPIONATE AND SALMETEROL 50; 250 UG/1; UG/1
250-50 POWDER RESPIRATORY (INHALATION)
Qty: 1 | Refills: 1 | Status: ACTIVE | COMMUNITY
Start: 2021-10-15 | End: 1900-01-01

## 2021-10-16 ENCOUNTER — TRANSCRIPTION ENCOUNTER (OUTPATIENT)
Age: 86
End: 2021-10-16

## 2021-10-20 ENCOUNTER — APPOINTMENT (OUTPATIENT)
Dept: OBGYN | Facility: CLINIC | Age: 86
End: 2021-10-20

## 2021-11-05 ENCOUNTER — RX RENEWAL (OUTPATIENT)
Age: 86
End: 2021-11-05

## 2021-11-12 ENCOUNTER — RX RENEWAL (OUTPATIENT)
Age: 86
End: 2021-11-12

## 2021-11-15 ENCOUNTER — APPOINTMENT (OUTPATIENT)
Dept: RADIOLOGY | Facility: CLINIC | Age: 86
End: 2021-11-15

## 2021-11-18 ENCOUNTER — RX RENEWAL (OUTPATIENT)
Age: 86
End: 2021-11-18

## 2021-11-28 ENCOUNTER — TRANSCRIPTION ENCOUNTER (OUTPATIENT)
Age: 86
End: 2021-11-28

## 2021-11-29 ENCOUNTER — LABORATORY RESULT (OUTPATIENT)
Age: 86
End: 2021-11-29

## 2021-11-29 ENCOUNTER — APPOINTMENT (OUTPATIENT)
Dept: CT IMAGING | Facility: CLINIC | Age: 86
End: 2021-11-29
Payer: MEDICARE

## 2021-11-29 ENCOUNTER — OUTPATIENT (OUTPATIENT)
Dept: OUTPATIENT SERVICES | Facility: HOSPITAL | Age: 86
LOS: 1 days | End: 2021-11-29
Payer: MEDICARE

## 2021-11-29 ENCOUNTER — NON-APPOINTMENT (OUTPATIENT)
Age: 86
End: 2021-11-29

## 2021-11-29 ENCOUNTER — APPOINTMENT (OUTPATIENT)
Dept: CT IMAGING | Facility: IMAGING CENTER | Age: 86
End: 2021-11-29

## 2021-11-29 ENCOUNTER — APPOINTMENT (OUTPATIENT)
Dept: CARDIOLOGY | Facility: CLINIC | Age: 86
End: 2021-11-29
Payer: MEDICARE

## 2021-11-29 VITALS
WEIGHT: 130 LBS | HEART RATE: 53 BPM | OXYGEN SATURATION: 95 % | DIASTOLIC BLOOD PRESSURE: 60 MMHG | BODY MASS INDEX: 24.56 KG/M2 | SYSTOLIC BLOOD PRESSURE: 140 MMHG

## 2021-11-29 VITALS — SYSTOLIC BLOOD PRESSURE: 168 MMHG | DIASTOLIC BLOOD PRESSURE: 68 MMHG

## 2021-11-29 DIAGNOSIS — Z90.49 ACQUIRED ABSENCE OF OTHER SPECIFIED PARTS OF DIGESTIVE TRACT: Chronic | ICD-10-CM

## 2021-11-29 DIAGNOSIS — R73.09 OTHER ABNORMAL GLUCOSE: ICD-10-CM

## 2021-11-29 DIAGNOSIS — S09.90XA UNSPECIFIED INJURY OF HEAD, INITIAL ENCOUNTER: ICD-10-CM

## 2021-11-29 PROCEDURE — G1004: CPT

## 2021-11-29 PROCEDURE — 99214 OFFICE O/P EST MOD 30 MIN: CPT

## 2021-11-29 PROCEDURE — 70450 CT HEAD/BRAIN W/O DYE: CPT | Mod: 26,ME

## 2021-11-29 PROCEDURE — 70450 CT HEAD/BRAIN W/O DYE: CPT | Mod: ME

## 2021-11-29 PROCEDURE — 93000 ELECTROCARDIOGRAM COMPLETE: CPT

## 2021-11-29 PROCEDURE — 36415 COLL VENOUS BLD VENIPUNCTURE: CPT

## 2021-11-29 PROCEDURE — 93040 RHYTHM ECG WITH REPORT: CPT | Mod: 59

## 2021-11-29 NOTE — HISTORY OF PRESENT ILLNESS
[FreeTextEntry1] : Mechanical fall 3 days ago with head trauma.\par Balance is poor.\par Her  states that her short-term memory is decreased, but the patient denies this.\par She denies chest pain, shortness of breath, and palpitations.\par

## 2021-12-01 ENCOUNTER — NON-APPOINTMENT (OUTPATIENT)
Age: 86
End: 2021-12-01

## 2021-12-01 LAB
ALBUMIN SERPL ELPH-MCNC: 4.3 G/DL
ALP BLD-CCNC: 78 U/L
ALT SERPL-CCNC: 10 U/L
ANION GAP SERPL CALC-SCNC: 12 MMOL/L
AST SERPL-CCNC: 15 U/L
BASOPHILS # BLD AUTO: 0.06 K/UL
BASOPHILS NFR BLD AUTO: 0.8 %
BILIRUB SERPL-MCNC: 0.7 MG/DL
BUN SERPL-MCNC: 21 MG/DL
CALCIUM SERPL-MCNC: 9.6 MG/DL
CHLORIDE SERPL-SCNC: 103 MMOL/L
CHOLEST SERPL-MCNC: 134 MG/DL
CO2 SERPL-SCNC: 27 MMOL/L
CREAT SERPL-MCNC: 0.66 MG/DL
EOSINOPHIL # BLD AUTO: 0.15 K/UL
EOSINOPHIL NFR BLD AUTO: 1.9 %
ESTIMATED AVERAGE GLUCOSE: 117 MG/DL
GLUCOSE SERPL-MCNC: 99 MG/DL
HBA1C MFR BLD HPLC: 5.7 %
HCT VFR BLD CALC: 37.1 %
HDLC SERPL-MCNC: 41 MG/DL
HGB BLD-MCNC: 11.9 G/DL
IMM GRANULOCYTES NFR BLD AUTO: 0.4 %
LDLC SERPL CALC-MCNC: 54 MG/DL
LDLC SERPL DIRECT ASSAY-MCNC: 61 MG/DL
LYMPHOCYTES # BLD AUTO: 2.24 K/UL
LYMPHOCYTES NFR BLD AUTO: 28.5 %
MAN DIFF?: NORMAL
MCHC RBC-ENTMCNC: 32.1 GM/DL
MCHC RBC-ENTMCNC: 33.1 PG
MCV RBC AUTO: 103.1 FL
MONOCYTES # BLD AUTO: 0.86 K/UL
MONOCYTES NFR BLD AUTO: 10.9 %
NEUTROPHILS # BLD AUTO: 4.52 K/UL
NEUTROPHILS NFR BLD AUTO: 57.5 %
NONHDLC SERPL-MCNC: 93 MG/DL
PLATELET # BLD AUTO: 232 K/UL
POTASSIUM SERPL-SCNC: 4.6 MMOL/L
PROT SERPL-MCNC: 6.9 G/DL
RBC # BLD: 3.6 M/UL
RBC # FLD: 13.4 %
SODIUM SERPL-SCNC: 142 MMOL/L
T3 SERPL-MCNC: 69 NG/DL
T3RU NFR SERPL: 1 TBI
T4 FREE SERPL-MCNC: 1.7 NG/DL
T4 SERPL-MCNC: 8.4 UG/DL
TRIGL SERPL-MCNC: 196 MG/DL
TSH SERPL-ACNC: 0.89 UIU/ML
WBC # FLD AUTO: 7.86 K/UL

## 2021-12-11 ENCOUNTER — APPOINTMENT (OUTPATIENT)
Dept: CT IMAGING | Facility: CLINIC | Age: 86
End: 2021-12-11

## 2021-12-12 ENCOUNTER — RX RENEWAL (OUTPATIENT)
Age: 86
End: 2021-12-12

## 2021-12-30 ENCOUNTER — APPOINTMENT (OUTPATIENT)
Dept: CARDIOLOGY | Facility: CLINIC | Age: 86
End: 2021-12-30
Payer: MEDICARE

## 2021-12-30 ENCOUNTER — NON-APPOINTMENT (OUTPATIENT)
Age: 86
End: 2021-12-30

## 2021-12-30 VITALS — DIASTOLIC BLOOD PRESSURE: 62 MMHG | SYSTOLIC BLOOD PRESSURE: 142 MMHG

## 2021-12-30 VITALS
DIASTOLIC BLOOD PRESSURE: 60 MMHG | WEIGHT: 130 LBS | SYSTOLIC BLOOD PRESSURE: 122 MMHG | OXYGEN SATURATION: 98 % | HEART RATE: 54 BPM | BODY MASS INDEX: 24.56 KG/M2

## 2021-12-30 DIAGNOSIS — I45.10 UNSPECIFIED RIGHT BUNDLE-BRANCH BLOCK: ICD-10-CM

## 2021-12-30 DIAGNOSIS — R00.1 BRADYCARDIA, UNSPECIFIED: ICD-10-CM

## 2021-12-30 PROCEDURE — 93306 TTE W/DOPPLER COMPLETE: CPT

## 2021-12-30 PROCEDURE — 99214 OFFICE O/P EST MOD 30 MIN: CPT

## 2021-12-30 PROCEDURE — 93040 RHYTHM ECG WITH REPORT: CPT | Mod: 59

## 2021-12-30 PROCEDURE — 36415 COLL VENOUS BLD VENIPUNCTURE: CPT

## 2021-12-30 PROCEDURE — 93000 ELECTROCARDIOGRAM COMPLETE: CPT

## 2021-12-30 NOTE — HISTORY OF PRESENT ILLNESS
[FreeTextEntry1] : 11/29/2021 - Office visit:\par Mechanical fall 3 days ago with head trauma.\par Balance is poor.\par Her  states that her short-term memory is decreased, but the patient denies this.\par She denies chest pain, shortness of breath, and palpitations.\par \par 12/30/2021 - Office visit:\par She is on losartan 25 daily.\par She denies chest pain, shortness of breath, and palpitations.\par \par

## 2022-01-03 ENCOUNTER — NON-APPOINTMENT (OUTPATIENT)
Age: 87
End: 2022-01-03

## 2022-04-18 RX ORDER — ESTRADIOL 0.1 MG/G
0.1 CREAM VAGINAL
Qty: 1 | Refills: 0 | Status: ACTIVE | COMMUNITY
Start: 2021-04-14 | End: 1900-01-01

## 2022-07-08 ENCOUNTER — APPOINTMENT (OUTPATIENT)
Dept: CARDIOLOGY | Facility: CLINIC | Age: 87
End: 2022-07-08

## 2022-07-08 DIAGNOSIS — I10 ESSENTIAL (PRIMARY) HYPERTENSION: ICD-10-CM

## 2022-07-08 DIAGNOSIS — R06.09 OTHER FORMS OF DYSPNEA: ICD-10-CM

## 2022-07-08 DIAGNOSIS — I34.0 NONRHEUMATIC MITRAL (VALVE) INSUFFICIENCY: ICD-10-CM

## 2022-07-08 DIAGNOSIS — E03.9 HYPOTHYROIDISM, UNSPECIFIED: ICD-10-CM

## 2022-07-08 DIAGNOSIS — E78.00 PURE HYPERCHOLESTEROLEMIA, UNSPECIFIED: ICD-10-CM

## 2022-07-08 PROCEDURE — 99214 OFFICE O/P EST MOD 30 MIN: CPT | Mod: 95

## 2022-07-10 LAB
ALBUMIN SERPL ELPH-MCNC: 4 G/DL
ALP BLD-CCNC: 85 U/L
ALT SERPL-CCNC: 10 U/L
ANION GAP SERPL CALC-SCNC: 12 MMOL/L
AST SERPL-CCNC: 13 U/L
BILIRUB SERPL-MCNC: 0.4 MG/DL
BUN SERPL-MCNC: 18 MG/DL
CALCIUM SERPL-MCNC: 9.1 MG/DL
CHLORIDE SERPL-SCNC: 106 MMOL/L
CO2 SERPL-SCNC: 28 MMOL/L
CREAT SERPL-MCNC: 0.76 MG/DL
GLUCOSE SERPL-MCNC: 77 MG/DL
POTASSIUM SERPL-SCNC: 4.8 MMOL/L
PROT SERPL-MCNC: 7.1 G/DL
SODIUM SERPL-SCNC: 146 MMOL/L

## 2022-07-20 ENCOUNTER — APPOINTMENT (OUTPATIENT)
Dept: MAMMOGRAPHY | Facility: CLINIC | Age: 87
End: 2022-07-20

## 2022-07-20 ENCOUNTER — APPOINTMENT (OUTPATIENT)
Dept: ULTRASOUND IMAGING | Facility: CLINIC | Age: 87
End: 2022-07-20

## 2022-10-05 ENCOUNTER — RX RENEWAL (OUTPATIENT)
Age: 87
End: 2022-10-05

## 2022-10-05 RX ORDER — LOSARTAN POTASSIUM 25 MG/1
25 TABLET, FILM COATED ORAL DAILY
Qty: 90 | Refills: 1 | Status: ACTIVE | COMMUNITY
Start: 2021-11-29 | End: 1900-01-01

## 2022-10-05 RX ORDER — LEVOTHYROXINE SODIUM 0.15 MG/1
150 TABLET ORAL
Qty: 90 | Refills: 1 | Status: ACTIVE | COMMUNITY
Start: 2020-09-17 | End: 1900-01-01

## 2022-10-13 ENCOUNTER — APPOINTMENT (OUTPATIENT)
Dept: OBGYN | Facility: CLINIC | Age: 87
End: 2022-10-13

## 2022-10-19 NOTE — ED ADULT TRIAGE NOTE - ARRIVAL FROM
Other hospital/urgent care Albendazole Pregnancy And Lactation Text: This medication is Pregnancy Category C and it isn't known if it is safe during pregnancy. It is also excreted in breast milk.

## 2023-12-05 NOTE — ED PROVIDER NOTE - SKIN TEMP
OHN HTN REPORT: per chart review pt last documented bp is elevated, attempted to contact pt no ans, vm not set up, portal message sent.   warm/dry

## 2024-09-10 NOTE — ED PROCEDURE NOTE - [facility] ED PHYSICIAN PRESENCE1
Anesthesia Pre Eval Note    Anesthesia ROS/Med Hx          Cardiovascular Review:     Positive for hypertension    End/Other Review:    Positive for arthritis      Relevant Problems   No relevant active problems       Physical Exam     Airway   Mallampati: II  TM Distance: >3 FB  Neck ROM: Full  Neck: Non-tender and Able to place in sniff position  TMJ Mobility: Good    Cardiovascular  Cardiovascular exam normal  Cardio Rhythm: Regular  Cardio Rate: Normal    Head Assessment  Head assessment: Normocephalic and Atraumatic    General Assessment  General Assessment: Alert and oriented and No acute distress    Dental Exam  Dental exam normal    Pulmonary Exam  Pulmonary exam normal  Breath sounds clear to auscultation:  Yes    Abdominal Exam  Abdominal exam normal      Anesthesia Plan:    ASA Status: 2  Anesthesia Type: Spinal      Post-op Pain Management: Per Surgeon and Single Shot Block      Checklist  Reviewed: NPO Status, Allergies, Medications, Problem list, Past Med History, Patient Summary and Lab Results  Consent/Risks Discussed Statement:  The proposed anesthetic plan, including its risks and benefits, have been discussed with the Patient along with the risks and benefits of alternatives. Questions were encouraged and answered and the patient and/or representative understands and agrees to proceed.        I discussed with the patient (and/or patient's legal representative) the risks and benefits of the proposed anesthesia plan, Spinal, which may include services performed by other anesthesia providers.    Alternative anesthesia plans, if available, were reviewed with the patient (and/or patient's legal representative). Discussion has been held with the patient (and/or patient's legal representative) regarding risks of anesthesia, which include Hypotension, headache, nausea and vomiting and emergent situations that may require change in anesthesia plan.    The patient (and/or patient's legal representative) has  indicated understanding, his/her questions have been answered, and he/she wishes to proceed with the planned anesthetic.    Blood Products: Not Anticipated     I was present during the key portion of the procedure.

## 2025-02-19 NOTE — ED ADULT NURSE NOTE - CARDIO ASSESSMENT
Patient called asking to speak to [facility].  Warm transfer to [facility].    
WDL

## 2025-03-07 NOTE — ED PROVIDER NOTE - NSCAREINITIATED _GEN_ER
BRIAN    Caller: Misa pt    Doctor: Dr. Barajas    Reason for call: after a very lengthy conversation with the pt.  She cx'd her next OV because she has not completed PT.  Pt will call back once she finished the PT.    Pt is going through a lot with potentially losing her SSD, food stamps and health insurance.  Pt stated she has a  that is working with her to try & keep her benefits.  Pt is very concerned that if she loses her benefits that she will end up homeless.   Pt has been very anxious with all of these things happening and just can't concentrate on PT at the moment.    Is there a potential nurse  that could give the pt some guidance with housing and food?    Call back#: [phone]   Milan Grubbs(Resident)